# Patient Record
Sex: FEMALE | Race: BLACK OR AFRICAN AMERICAN | Employment: OTHER | URBAN - METROPOLITAN AREA
[De-identification: names, ages, dates, MRNs, and addresses within clinical notes are randomized per-mention and may not be internally consistent; named-entity substitution may affect disease eponyms.]

---

## 2023-03-16 ENCOUNTER — APPOINTMENT (OUTPATIENT)
Dept: GENERAL RADIOLOGY | Age: 53
End: 2023-03-16
Payer: MEDICAID

## 2023-03-16 ENCOUNTER — HOSPITAL ENCOUNTER (OUTPATIENT)
Age: 53
Setting detail: OBSERVATION
Discharge: HOME | End: 2023-03-17
Attending: EMERGENCY MEDICINE | Admitting: EMERGENCY MEDICINE
Payer: MEDICAID
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
## 2023-03-16 DIAGNOSIS — S82.832A CLOSED FRACTURE OF DISTAL END OF LEFT FIBULA, UNSPECIFIED FRACTURE MORPHOLOGY, INITIAL ENCOUNTER: Primary | ICD-10-CM

## 2023-03-16 PROBLEM — S82.301A FRACTURE OF DISTAL END OF TIBIA WITH FIBULA, RIGHT, CLOSED, INITIAL ENCOUNTER: Status: ACTIVE | Noted: 2023-03-16

## 2023-03-16 PROBLEM — S82.831A FRACTURE OF DISTAL END OF TIBIA WITH FIBULA, RIGHT, CLOSED, INITIAL ENCOUNTER: Status: ACTIVE | Noted: 2023-03-16

## 2023-03-16 LAB
SARS-COV-2 RDRP RESP QL NAA+PROBE: NOT DETECTED
SPECIMEN DESCRIPTION: NORMAL

## 2023-03-16 PROCEDURE — 72170 X-RAY EXAM OF PELVIS: CPT

## 2023-03-16 PROCEDURE — 87635 SARS-COV-2 COVID-19 AMP PRB: CPT

## 2023-03-16 PROCEDURE — 73600 X-RAY EXAM OF ANKLE: CPT

## 2023-03-16 PROCEDURE — 99285 EMERGENCY DEPT VISIT HI MDM: CPT

## 2023-03-16 PROCEDURE — 73590 X-RAY EXAM OF LOWER LEG: CPT

## 2023-03-16 PROCEDURE — 73610 X-RAY EXAM OF ANKLE: CPT

## 2023-03-16 PROCEDURE — G0378 HOSPITAL OBSERVATION PER HR: HCPCS

## 2023-03-16 PROCEDURE — 73630 X-RAY EXAM OF FOOT: CPT

## 2023-03-16 PROCEDURE — 6370000000 HC RX 637 (ALT 250 FOR IP): Performed by: STUDENT IN AN ORGANIZED HEALTH CARE EDUCATION/TRAINING PROGRAM

## 2023-03-16 RX ORDER — ERGOCALCIFEROL 1.25 MG/1
50000 CAPSULE ORAL WEEKLY
Qty: 8 CAPSULE | Refills: 0 | Status: SHIPPED | OUTPATIENT
Start: 2023-03-16 | End: 2023-05-05

## 2023-03-16 RX ORDER — PANTOPRAZOLE SODIUM 40 MG/1
40 TABLET, DELAYED RELEASE ORAL DAILY
COMMUNITY

## 2023-03-16 RX ORDER — SODIUM CHLORIDE 0.9 % (FLUSH) 0.9 %
5-40 SYRINGE (ML) INJECTION EVERY 12 HOURS SCHEDULED
Status: DISCONTINUED | OUTPATIENT
Start: 2023-03-16 | End: 2023-03-17 | Stop reason: HOSPADM

## 2023-03-16 RX ORDER — SODIUM CHLORIDE 0.9 % (FLUSH) 0.9 %
5-40 SYRINGE (ML) INJECTION PRN
Status: DISCONTINUED | OUTPATIENT
Start: 2023-03-16 | End: 2023-03-17 | Stop reason: HOSPADM

## 2023-03-16 RX ORDER — ACETAMINOPHEN 325 MG/1
650 TABLET ORAL EVERY 4 HOURS PRN
Status: DISCONTINUED | OUTPATIENT
Start: 2023-03-16 | End: 2023-03-17

## 2023-03-16 RX ORDER — LATANOPROST 50 UG/ML
1 SOLUTION/ DROPS OPHTHALMIC NIGHTLY
COMMUNITY

## 2023-03-16 RX ORDER — ENOXAPARIN SODIUM 100 MG/ML
40 INJECTION SUBCUTANEOUS DAILY
Status: DISCONTINUED | OUTPATIENT
Start: 2023-03-17 | End: 2023-03-17 | Stop reason: HOSPADM

## 2023-03-16 RX ORDER — OXYCODONE HYDROCHLORIDE AND ACETAMINOPHEN 5; 325 MG/1; MG/1
1 TABLET ORAL ONCE
Status: COMPLETED | OUTPATIENT
Start: 2023-03-16 | End: 2023-03-16

## 2023-03-16 RX ORDER — HYDROCHLOROTHIAZIDE 25 MG/1
25 TABLET ORAL DAILY
Status: DISCONTINUED | OUTPATIENT
Start: 2023-03-17 | End: 2023-03-17 | Stop reason: HOSPADM

## 2023-03-16 RX ORDER — HYDROCHLOROTHIAZIDE 25 MG/1
25 TABLET ORAL DAILY
COMMUNITY

## 2023-03-16 RX ORDER — MULTIVIT-MIN/IRON/FOLIC ACID/K 18-600-40
CAPSULE ORAL
COMMUNITY

## 2023-03-16 RX ORDER — OXYCODONE HYDROCHLORIDE 5 MG/1
5 TABLET ORAL EVERY 6 HOURS PRN
Status: DISCONTINUED | OUTPATIENT
Start: 2023-03-16 | End: 2023-03-17

## 2023-03-16 RX ORDER — LORATADINE 10 MG/1
10 CAPSULE, LIQUID FILLED ORAL DAILY
COMMUNITY

## 2023-03-16 RX ORDER — MONTELUKAST SODIUM 10 MG/1
10 TABLET ORAL NIGHTLY
COMMUNITY

## 2023-03-16 RX ORDER — ONDANSETRON 2 MG/ML
4 INJECTION INTRAMUSCULAR; INTRAVENOUS EVERY 6 HOURS PRN
Status: DISCONTINUED | OUTPATIENT
Start: 2023-03-16 | End: 2023-03-17 | Stop reason: HOSPADM

## 2023-03-16 RX ORDER — SODIUM CHLORIDE 9 MG/ML
INJECTION, SOLUTION INTRAVENOUS PRN
Status: DISCONTINUED | OUTPATIENT
Start: 2023-03-16 | End: 2023-03-17 | Stop reason: HOSPADM

## 2023-03-16 RX ORDER — ONDANSETRON 4 MG/1
4 TABLET, ORALLY DISINTEGRATING ORAL EVERY 8 HOURS PRN
Status: DISCONTINUED | OUTPATIENT
Start: 2023-03-16 | End: 2023-03-17 | Stop reason: HOSPADM

## 2023-03-16 RX ORDER — ACETAMINOPHEN 500 MG
1000 TABLET ORAL EVERY 6 HOURS
Status: DISCONTINUED | OUTPATIENT
Start: 2023-03-16 | End: 2023-03-17 | Stop reason: HOSPADM

## 2023-03-16 RX ADMIN — OXYCODONE HYDROCHLORIDE AND ACETAMINOPHEN 1 TABLET: 5; 325 TABLET ORAL at 21:43

## 2023-03-16 RX ADMIN — OXYCODONE HYDROCHLORIDE AND ACETAMINOPHEN 1 TABLET: 5; 325 TABLET ORAL at 15:41

## 2023-03-16 ASSESSMENT — PAIN DESCRIPTION - DESCRIPTORS: DESCRIPTORS: ACHING;BURNING

## 2023-03-16 ASSESSMENT — LIFESTYLE VARIABLES
HOW OFTEN DO YOU HAVE A DRINK CONTAINING ALCOHOL: NEVER
HOW MANY STANDARD DRINKS CONTAINING ALCOHOL DO YOU HAVE ON A TYPICAL DAY: PATIENT DOES NOT DRINK

## 2023-03-16 ASSESSMENT — PAIN DESCRIPTION - ORIENTATION
ORIENTATION: LEFT
ORIENTATION: RIGHT
ORIENTATION: LEFT

## 2023-03-16 ASSESSMENT — PAIN SCALES - GENERAL
PAINLEVEL_OUTOF10: 7
PAINLEVEL_OUTOF10: 10

## 2023-03-16 ASSESSMENT — ENCOUNTER SYMPTOMS
BACK PAIN: 0
ABDOMINAL PAIN: 0
SHORTNESS OF BREATH: 0
NAUSEA: 0
VOMITING: 0

## 2023-03-16 ASSESSMENT — PAIN DESCRIPTION - LOCATION
LOCATION: ANKLE

## 2023-03-16 ASSESSMENT — PAIN - FUNCTIONAL ASSESSMENT
PAIN_FUNCTIONAL_ASSESSMENT: 0-10
PAIN_FUNCTIONAL_ASSESSMENT: 0-10

## 2023-03-16 NOTE — ED PROVIDER NOTES
Handoff taken on the following patient from prior Attending Physician:    Pt Name: Jose Raul Smith    PCP:  No primary care provider on file. Attestation    I was available and discussed any additional care issues that arose and coordinated the management plans with the resident(s) caring for the patient during my duty period. Any areas of disagreement with residents documentation of care or procedures are noted on the chart. I was personally present for the key portions of any/all procedures during my duty period. I have documented in the chart those procedures where I was not present during the key portions.     Patient 58-year-old female injury to both ankles currently awaiting x-ray results was having difficulty ambulating     Humberto Fowler,   03/16/23 3672

## 2023-03-16 NOTE — CARE COORDINATION
Case Management Assessment  Initial Evaluation    Date/Time of Evaluation: 3/16/2023 5:30 PM  Assessment Completed by: Maria G Shah RN    If patient is discharged prior to next notation, then this note serves as note for discharge by case management. Patient Name: Michoacano Henry                   YOB: 1970  Diagnosis: No admission diagnoses are documented for this encounter. Date / Time: 3/16/2023  3:15 PM    Patient Admission Status: Emergency   Readmission Risk (Low < 19, Mod (19-27), High > 27): No data recorded  Current PCP: No primary care provider on file. PCP verified by CM? Yes (Melanie Buckner)    Chart Reviewed: Yes      History Provided by: Patient  Patient Orientation: Alert and Oriented    Patient Cognition: Alert    Hospitalization in the last 30 days (Readmission):  No    If yes, Readmission Assessment in CM Navigator will be completed. Advance Directives:      Code Status: Not on file   Patient's Primary Decision Maker is:        Discharge Planning:    Patient lives with: Spouse/Significant Other Type of Home: Apartment  Primary Care Giver: Self  Patient Support Systems include: Spouse/Significant Other, Children   Current Financial resources:    Current community resources:    Current services prior to admission: None            Current DME:              Type of Home Care services:       ADLS  Prior functional level: Independent in ADLs/IADLs  Current functional level: Assistance with the following:, Shopping, Housework, Cooking, Mobility    PT AM-PAC:   /24  OT AM-PAC:   /24    Family can provide assistance at DC: Yes  Would you like Case Management to discuss the discharge plan with any other family members/significant others, and if so, who?  Yes (spouse)  Plans to Return to Present Housing: Yes  Other Identified Issues/Barriers to RETURNING to current housing: mobility   Potential Assistance needed at discharge: [place], Durable Medical Equipment            Potential DME: Kilo Ramirez, [address] [facility]  Patient expects to discharge to: (P) House  Plan for transportation at discharge:      Financial    Payor: Richard Fierro / Plan: Richard Fierro / Product Type: *No Product type* /     Does insurance require precert for SNF: Yes    Potential assistance Purchasing Medications: (P) No  Meds-to-Beds request:      No Pharmacies Listed    Notes:    Factors facilitating achievement of predicted outcomes: Family support, Cooperative, and Pleasant    Barriers to discharge: Pain and Medical complications    Additional Case Management Notes: Patient refusing SNF placement. Patient staying for pain control and PT/OT evaluation. [facility] and what DME is needed for the patient to safely return home. The Plan for Transition of Care is related to the following treatment goals of No admission diagnoses are documented for this encounter. IF APPLICABLE: The Patient and/or patient representative Prasanth Carroll and her family were provided with a choice of provider and agrees with the discharge plan. Freedom of choice list with basic dialogue that supports the patient's individualized plan of care/goals and shares the quality data associated with the providers was provided to:     Patient Representative Name:       The Patient and/or Patient Representative Agree with the Discharge Plan? Post Acute Facility/Agency List     Provided patient with the following list, the list includes the overall star ratings obtained from CMS per the Medicare Web site (www.Medicare.gov):     [] [address]  [] Acute Inpatient Rehabilitation Facilities  [] Skilled Nursing Facilities  [x] Home Care    Provided verbal instructions on how to utilize the QR Code to obtain additional detailed star ratings from www. Medicare. gov     offered to print and provide the detailed list:    []Accepted   [x]Declined    The following printed detailed lists were provided:      Erika Luke RN  Case Management Department  Ph: [phone]

## 2023-03-16 NOTE — ED TRIAGE NOTES
Pt arrives to the ER after rolling her ankle and falling down the steps. Pt has noticeable swelling to the left ankle.  Pt denies any LOC or any other injuries to the ankle

## 2023-03-16 NOTE — ED PROVIDER NOTES
[address] [facility]     Emergency Department     Faculty Attestation    I performed a history and physical examination of the patient and discussed management with the resident. I reviewed the residents note and agree with the documented findings and plan of care. Any areas of disagreement are noted on the chart. I was personally present for the key portions of any procedures. I have documented in the chart those procedures where I was not present during the key portions. I have reviewed the emergency nurses triage note. I agree with the chief complaint, past medical history, past surgical history, allergies, medications, social and family history as documented unless otherwise noted below. For Physician Assistant/ Nurse Practitioner cases/documentation I have personally evaluated this patient and have completed at least one if not all key elements of the E/M (history, physical exam, and MDM). Additional findings are as noted. Primary Care Physician:  No primary care provider on file.     CHIEF COMPLAINT       Chief Complaint   Patient presents with    Ankle Injury       RECENT VITALS:   Temp: 97.5 °F (36.4 °C),  Heart Rate: 77, Resp: 16, BP: (!) 155/84    LABS:  Labs Reviewed - No data to display      PERTINENT ATTENDING PHYSICIAN COMMENTS:    Patient presents with bilateral ankle pain and foot pain she was walking down a flight of stairs with no handrail and she stepped off the edge landing and twisting both ankles she has been able to ambulate since there is no head injury no knee or hip injury she is brought in by squad    critical Vinny Read MD, MD, [facility]  Attending Emergency  Physician              Yonathan Bob MD  03/16/23 9667

## 2023-03-16 NOTE — H&P
TRAUMA H&P/CONSULT    PATIENT NAME: Jayce Garcia  YOB: 1970  MEDICAL RECORD NO. [id number]   DATE: 3/16/2023  PRIMARY CARE PHYSICIAN: No primary care provider on file. PATIENT EVALUATED AT THE REQUEST OF : Phu Quiles    ACTIVATION   []Trauma Alert     [] Trauma Priority     [x]Trauma Consult. There is no problem list on file for this patient. IMPRESSION AND PLAN:     Fall down one stair, -LOC, -AC, left distal fibular fx, sprained right ankle  -Ortho consulted. No surgical intervention at this time. Placed in CAM boot and provided with crutches. WBAT BLE. Follow-up next week outpatient. -F/u XR pelvis    -If XR negative, dispo per ED    If intracranial hemorrhage is present, is it a:  [] BIG 1  [] BIG 2  [] BIG 3  If chest wall injury: Rib score___    CONSULT SERVICES    [] Neurosurgery     [x] Orthopedic Surgery    [] Cardiothoracic     [] Facial Trauma    [] Plastic Surgery (Burn)    [] Pediatric Surgery     [] Internal Medicine    [] Pulmonary Medicine    [] Geriatrics    [] Other:        HISTORY:     Chief Complaint:  \"I missed a step\"    GENERAL DATA  Patient information was obtained from patient. History/Exam limitations: none. Injury Date: 3/16/23   Approximate Injury Time: afternoon        Transport mode:   []Ambulance      [] Helicopter     [x]Car       [] Other  Referring Hospital: none    SETTING OF TRAUMATIC EVENT   Location (e.g., home, farm, industry, street): home  Specific Details of Location (e.g., bedroom, kitchen, garage, highway): stairs    MECHANISM OF INJURY         [x] Fall    []From Standing     []From Height __ Ft     [x]Down _1__steps  []Other___    HISTORY:     Jayce Garcia is a female that presented to the Emergency Department following twisting her left ankle and falling down one step at home. Patient landed on her left hip. She did not hit her head. No LOC. No AC. She is only complaining of bilateral ankle pain.  She denies any headache, neck pain, back pain, chest pain, or abdominal pain. Traumatic loss of Consciousness [x]No   []Yes Duration(min)       [] Unknown     Total Fluids Given Prior To Arrival  mL    MEDICATIONS:     Prior to Admission medications    Medication Sig Start Date End Date Taking? Authorizing Provider   hydroCHLOROthiazide (HYDRODIURIL) 25 MG tablet Take 25 mg by mouth daily   Yes Historical Provider, MD   pantoprazole (PROTONIX) 40 MG tablet Take 40 mg by mouth daily   Yes Historical Provider, MD   loratadine (CLARITIN) 10 MG capsule Take 10 mg by mouth daily   Yes Historical Provider, MD   montelukast (SINGULAIR) 10 MG tablet Take 10 mg by mouth nightly   Yes Historical Provider, MD   latanoprost (XALATAN) 0.005 % ophthalmic solution 1 drop nightly   Yes Historical Provider, MD   Cholecalciferol (VITAMIN D) 50 MCG (2000 UT) CAPS capsule Take by mouth   Yes Historical Provider, MD       ALLERGIES:     Ibuprofen and Morphine    PAST MEDICAL/SURGICAL HISTORY: Hypertension   has no past medical history on file. has no past surgical history on file. FAMILY HISTORY     family history is not on file. SOCIAL HISTORY     reports that she has never smoked. She uses smokeless tobacco.   reports that she does not currently use alcohol. reports that she does not currently use drugs. Review of Systems:    Review of Systems   Respiratory:  Negative for shortness of breath. Cardiovascular:  Negative for chest pain. Gastrointestinal:  Negative for abdominal pain, nausea and vomiting. Musculoskeletal:  Negative for back pain and neck pain. Positive for bilateral ankle pain. Neurological:  Negative for dizziness, weakness, light-headedness, numbness and headaches. Hematological:  Does not bruise/bleed easily. PHYSICAL EXAMINATION:     VITAL SIGNS:   Vitals:    03/16/23 1800   BP: (!) 151/102   Pulse:    Resp:    Temp:    SpO2:        Physical Exam  Constitutional:       General: She is not in acute distress.   HENT: Head: Normocephalic and atraumatic. Right Ear: External ear normal.      Left Ear: External ear normal.      Nose: Nose normal.      Mouth/Throat:      Mouth: Mucous membranes are moist.   Eyes:      Extraocular Movements: Extraocular movements intact. Conjunctiva/sclera: Conjunctivae normal.      Pupils: Pupils are equal, round, and reactive to light. Cardiovascular:      Rate and Rhythm: Normal rate. Pulses: Normal pulses. Pulmonary:      Effort: Pulmonary effort is normal. No respiratory distress. Chest:      Chest wall: No tenderness. Abdominal:      General: There is no distension. Palpations: Abdomen is soft. Tenderness: There is no abdominal tenderness. There is no guarding or rebound. Musculoskeletal:      Cervical back: Normal range of motion. No tenderness. Comments: Left ankle in CAM boot. Tenderness to lateral aspect of right ankle with mild swelling. Tenderness to lateral aspect of proximal fibula. Pelvis stable. No midline cervical, thoracic, or lumbar spine tenderness. Skin:     Findings: No bruising or lesion. Neurological:      General: No focal deficit present. Mental Status: She is alert and oriented to person, place, and time. Sensory: No sensory deficit. Motor: No weakness. FOCUSED ABDOMINAL SONOGRAM FOR TRAUMA (FAST): A good  quality examination was performed by Dr. Darian Mathis and representative images were obtained. [x] No free fluid in the abdomen   [] Free fluid in RUQ   [] Free fluid in LUQ  [] Free fluid in Pelvis  [] Pericardial fluid  [] Other:        RADIOLOGY  XR TIBIA FIBULA LEFT (2 VIEWS)   Final Result   1. Stable, acute nondisplaced oblique fracture of the distal fibula. 2. No evidence of instability on the stress views. XR ANKLE LEFT (2 VIEWS)   Final Result   1. Stable, acute nondisplaced oblique fracture of the distal fibula. 2. No evidence of instability on the stress views.          XR ANKLE LEFT (MIN 3 VIEWS)   Final Result   1. Left ankle and foot: Acute nondisplaced fracture of the distal fibula   (Ga C) with mild widening of the medial clear space. 2.  Right ankle and foot: No acute osseous abnormality. XR FOOT LEFT (MIN 3 VIEWS)   Final Result   1. Left ankle and foot: Acute nondisplaced fracture of the distal fibula   (Ga C) with mild widening of the medial clear space. 2.  Right ankle and foot: No acute osseous abnormality. XR FOOT RIGHT (MIN 3 VIEWS)   Final Result   1. Left ankle and foot: Acute nondisplaced fracture of the distal fibula   (Ga C) with mild widening of the medial clear space. 2.  Right ankle and foot: No acute osseous abnormality. XR ANKLE RIGHT (MIN 3 VIEWS)   Final Result   1. Left ankle and foot: Acute nondisplaced fracture of the distal fibula   (Ga C) with mild widening of the medial clear space. 2.  Right ankle and foot: No acute osseous abnormality.          XR PELVIS (1-2 VIEWS)    (Results Pending)         LABS  Labs Reviewed - No data to display      Nasim Ferreira MD  3/16/23, 7:35 PM

## 2023-03-16 NOTE — PROGRESS NOTES
This patient was assigned to me by error. This patient was never interviewed nor examined by me. I did not participate in the care of this patient.

## 2023-03-16 NOTE — ED PROVIDER NOTES
[facility] ED  Emergency Department Encounter  Emergency Medicine Resident     Pt Name:Mavis Bocanegra  MRN: [id number]  [facility] 1970  Date of evaluation: 3/16/23  PCP:  No primary care provider on file. Note Started: 4:13 PM EDT    CHIEF COMPLAINT       Chief Complaint   Patient presents with    Ankle Injury       HISTORY OF PRESENT ILLNESS  (Location/Symptom, Timing/Onset, Context/Setting, Quality, Duration, Modifying Factors, Severity.)      Livingston Opitz is a 47 y.o. female who presents with bilateral ankle pain after fall earlier today. Patient was walking downstairs but did not have a side rail and stepped off the side of the stairs. Denies hitting her head or loss of consciousness. No AC/AP. Complaining of bilateral ankle pain but denies pain elsewhere. No headache, changes in vision (chronic blurry vision unchanged from previous), numbness, weakness, neck pain, back pain, chest pain, abdominal pain. PAST MEDICAL / SURGICAL / SOCIAL / FAMILY HISTORY      has no past medical history on file. has no past surgical history on file.     Social History     Socioeconomic History    Marital status:      Spouse name: Not on file    Number of children: Not on file    Years of education: Not on file    Highest education level: Not on file   Occupational History    Not on file   Tobacco Use    Smoking status: Never    Smokeless tobacco: Current   Vaping Use    Vaping Use: Every day    Substances: Nicotine   Substance and Sexual Activity    Alcohol use: Not Currently    Drug use: Not Currently    Sexual activity: Yes     Partners: Male   Other Topics Concern    Not on file   Social History Narrative    Not on file     Social Determinants of Health     Financial Resource Strain: Not on file   Food Insecurity: Not on file   Transportation Needs: Not on file   Physical Activity: Not on file   Stress: Not on file   Social Connections: Not on file   Intimate Partner Violence: Not on file   Housing Stability: Not on file       History reviewed. No pertinent family history. Allergies:  Ibuprofen and Morphine    Home Medications:  Prior to Admission medications    Medication Sig Start Date End Date Taking? Authorizing Provider   hydroCHLOROthiazide (HYDRODIURIL) 25 MG tablet Take 25 mg by mouth daily   Yes Historical Provider, MD   pantoprazole (PROTONIX) 40 MG tablet Take 40 mg by mouth daily   Yes Historical Provider, MD   loratadine (CLARITIN) 10 MG capsule Take 10 mg by mouth daily   Yes Historical Provider, MD   montelukast (SINGULAIR) 10 MG tablet Take 10 mg by mouth nightly   Yes Historical Provider, MD   latanoprost (XALATAN) 0.005 % ophthalmic solution 1 drop nightly   Yes Historical Provider, MD   Cholecalciferol (VITAMIN D) 50 MCG (2000 UT) CAPS capsule Take by mouth   Yes Historical Provider, MD   vitamin D (ERGOCALCIFEROL) 1.25 MG (21614 UT) CAPS capsule Take 1 capsule by mouth once a week for 8 doses 3/16/23 5/5/23 Yes Lorita Pickup, DO     REVIEW OF SYSTEMS       Review of Systems   Musculoskeletal:         Positive for bilateral ankle pain   See HPI for pertinent negatives     PHYSICAL EXAM      INITIAL VITALS:   BP (!) 151/102   Pulse 77   Temp 97.5 °F (36.4 °C) (Oral)   Resp 16   SpO2 100%     Physical Exam  Constitutional:       General: She is not in acute distress. Appearance: Normal appearance. HENT:      Head: Normocephalic and atraumatic. Right Ear: Tympanic membrane, ear canal and external ear normal.      Left Ear: Tympanic membrane, ear canal and external ear normal.      Mouth/Throat:      Mouth: Mucous membranes are moist.   Eyes:      General:         Right eye: No discharge. Left eye: No discharge. Extraocular Movements: Extraocular movements intact. Pupils: Pupils are equal, round, and reactive to light. Cardiovascular:      Rate and Rhythm: Normal rate and regular rhythm. Pulses: Normal pulses.       Heart sounds: No murmur heard. Pulmonary:      Effort: Pulmonary effort is normal. No respiratory distress. Breath sounds: Normal breath sounds. No wheezing, rhonchi or rales. Abdominal:      Palpations: Abdomen is soft. Tenderness: There is no abdominal tenderness. There is no guarding or rebound. Musculoskeletal:         General: No deformity or signs of injury. Cervical back: No muscular tenderness. Comments: Tenderness and swelling of bilateral lateral malleoli, left greater than right. No midline spinal tenderness. No clavicular tenderness, chest wall tenderness, abdominal tenderness. Pelvis stable. No tenderness of BUE. 5/5 strength BUE/BLE. Sensation intact in all extremities. Radial and pedal pulses 2+. Skin:     General: Skin is warm and dry. Capillary Refill: Capillary refill takes less than 2 seconds. Neurological:      General: No focal deficit present. Mental Status: She is alert and oriented to person, place, and time. Cranial Nerves: No cranial nerve deficit. Sensory: No sensory deficit. Motor: No weakness. Coordination: Coordination normal.      Gait: Gait normal.      Deep Tendon Reflexes: Reflexes normal.      Comments: A&O x3.  Cranial nerves II-XII intact. 5/5 strength in BUE/BLE. Sensation intact. Finger-nose-finger intact. No pronator drift. DDX/DIAGNOSTIC RESULTS / EMERGENCY DEPARTMENT COURSE / MDM     Medical Decision Making  79-year-old female who presents for evaluation of bilateral ankle pain following fall. Patient slightly hypertensive but vitals otherwise unremarkable. On exam patient appears uncomfortable but nontoxic. Heart RRR, lungs clear. No midline spinal tenderness. No focal neurologic deficits. Low risk for head injury given [place] head CT criteria. Low risk for C-spine injury via [place] C-spine criteria. Tenderness and swelling of bilateral lateral malleoli, left greater than right. Neurovascularly intact. Will obtain bilateral foot/ankle x-rays. Percocet for pain. Amount and/or Complexity of Data Reviewed  Independent Historian: spouse  Radiology: ordered. Risk  Prescription drug management. Decision regarding hospitalization. EKG  None    All EKG's are interpreted by the Emergency Department Physician who either signs or Co-signs this chart in the absence of a cardiologist.    EMERGENCY DEPARTMENT COURSE:    ED Course as of 03/16/23 2227   Thu Mar 16, 2023   1639 IMPRESSION:  1. Left ankle and foot: Acute nondisplaced fracture of the distal fibula  (Ga C) with mild widening of the medial clear space. 2.  Right ankle and foot: No acute osseous abnormality [AF]   1643 Ortho consulted [AF]   1081 Spoke to orthopedics who placed a splint and recommends outpatient follow-up. Patient is unable to ambulate due to right ankle pain. Seen by case management who is unable to place today. Will consult trauma [AF]   1938 Hip x-ray obtained per trauma. If negative okay for admission to observation.  [AF]   2043 IMPRESSION:  Contour abnormality of the left superior pubic ramus, for which a underlying  fracture is suspected. Correlation with CT is suggested. [AF]   9248 Patient reevaluated by orthopedics. No additional imaging necessary. Trauma team signed off. Will admit to observation for PT/OT as patient is unable to ambulate due to bilateral ankle pain. Patient is weightbearing as tolerated per Ortho. [AF]      ED Course User Index  [AF] Vasile Lee DO       PROCEDURES:  None    CONSULTS:  IP CONSULT TO ORTHOPEDIC SURGERY  IP CONSULT TO TRAUMA SURGERY    CRITICAL CARE:  There was significant risk of life threatening deterioration of patient's condition requiring my direct management. Critical care time 0 minutes, excluding any documented procedures. FINAL IMPRESSION      1. Closed fracture of distal end of left fibula, unspecified fracture morphology, initial encounter    2.  Fall from bed, initial encounter          DISPOSITION / Andrestyvan Stringer. 291 Admitted 03/16/2023 10:23:34 PM      PATIENT REFERRED TO:  No follow-up provider specified.     DISCHARGE MEDICATIONS:  Current Discharge Medication List        START taking these medications    Details   vitamin D (ERGOCALCIFEROL) 1.25 MG (32672 UT) CAPS capsule Take 1 capsule by mouth once a week for 8 doses  Qty: 8 capsule, Refills: 0             Yaa Burks DO  Emergency Medicine Resident    (Please note that portions of thisnote were completed with a voice recognition program.  Efforts were made to edit the dictations but occasionally words are mis-transcribed.)       Yaa Burks DO  Resident  03/16/23 8355

## 2023-03-16 NOTE — DISCHARGE INSTRUCTIONS
You were admitted to the hospital for right sprained ankle and left distal fibular fracture. You were evaluated by Orthopedics and suggested to wear bilateral CAM boots. You worked with physical therapy who recommend crutches and a walker along with a shower chair. If you notice any concerning symptoms please return to the ER immediately. These can include but are not limited to: fevers, chills, shortness of breath, vomiting, weakness of the extremities, changes in your mental status, numbness, pale extremities, or chest pain. Medications: Continue taking your home medications as previously directed. For pain You may take tylenol 1,000mg by mouth every 6 hours as needed for pain. You may also take flexeril, a muscle relaxer. Do not drive or operate heavy machinery while taking this medication as it may make you sleepy. You may alternate application of ice and heat 20 minutes at a time as desired. Follow up: Please follow up with Dr. Josh Allen as scheduled     Orthopaedic Instructions:  -Weight bearing status: Weight bearing as tolerated with the left and right legs  -Maintain the protective boots at all times, use crutches to assist ambulation. Only remove boots while showering or sleeping.  -Always look for signs of compartment syndrome: pain out of proportion to the injury, pain not controlled with pain medication, numbness in digits, changing of color of digits (paleness). If these signs occur return to ED immediately for reassessment.  -Ice (20 minutes on and off 1 hour) and elevate above the level of the heart to reduce swelling and throbbing pain.  -Call the office or come to Emergency Room if signs of infection appear (hot, swollen, red, draining pus, fever). -Take medications as prescribed. -Follow up with Dr. Josh Allen in his office 3/21/23 at 8:15am. Call [phone] to schedule/confirm or with any questions/concerns.

## 2023-03-16 NOTE — CONSULTS
ORTHOPAEDIC SURGERY   CONSULT NOTE  (Dr. Alfa Reyes)    CC/Reason for Consult:    Left distal fibular fracture    HPI     47 y.o. female presented to the Emergency Department for evaluation of their left ankle pain. She was walking down the stairs, and as she reached the last step, she inadvertently rolled her right ankle inward and it caused her to come down on her left ankle with inversion dorsiflexion type mechanism. She denies hitting her head or any loss of consciousness. Denies lightheadedness or dizziness preceding the fall. She had immediate pain in her left ankle. She was incapable of standing or ambulating following the fall. Her  and the mailman helped to bring herself inside to the couch. EMS was called and she was taken here for evaluation. Denies any prior history of ankle pain prior to this ankle. Denies any numbness, burning, tingling sensations. Has no other orthopaedic complaints at this time. She denies any pelvic pain, denies any remote history of pelvic fractures or injuries. At baseline, the patient ambulates without assisted devices, including a cane, walker, or wheelchair. Patient denies taking chemical anticoagulation. The patient works as a home health aid. Has prior history of bleeding ulcer and this precludes her intake of NSAIDs. Endorses vaping nicotine products. Denies history of DM. Past Orthopaedic Hx:  Remote history of breaking a lesser toe on her right side (cannot recall specific location) but denies any surgical intervention. Past Medical Hx:    has no past medical history on file. has no past surgical history on file. Past Surgical Hx:  History reviewed. No pertinent surgical history. Medications:  Prior to Admission medications    Medication Sig Start Date End Date Taking?  Authorizing Provider   hydroCHLOROthiazide (HYDRODIURIL) 25 MG tablet Take 25 mg by mouth daily   Yes Historical Provider, MD   pantoprazole (PROTONIX) 40 MG tablet Take 40 mg by mouth daily   Yes Historical Provider, MD   loratadine (CLARITIN) 10 MG capsule Take 10 mg by mouth daily   Yes Historical Provider, MD   montelukast (SINGULAIR) 10 MG tablet Take 10 mg by mouth nightly   Yes Historical Provider, MD   latanoprost (XALATAN) 0.005 % ophthalmic solution 1 drop nightly   Yes Historical Provider, MD   Cholecalciferol (VITAMIN D) 50 MCG (2000 UT) CAPS capsule Take by mouth   Yes Historical Provider, MD     Allergies:     Ibuprofen and Morphine    Social Hx:    reports that she has never smoked. She uses smokeless tobacco.   reports that she does not currently use alcohol. reports that she does not currently use drugs. Family Hx:     family history is not on file. Pertinent Systemic Review:    Constitutional: Negative for fever and chills. Respiratory: Negative for cough. Cardiovascular: Negative for chest pain. Musculoskeletal: Positive for pain in their left and right ankles. Skin: Negative for itching and rash. Neurological: Negative for numbness, tingling, weakness. OBJECTIVE     PE:  Blood pressure (!) 155/84, pulse 77, temperature 97.5 °F (36.4 °C), temperature source Oral, resp. rate 16, SpO2 100 %. General: Alert and Oriented, NAD, Cooperative. Head: Normocephalic, Atraumatic. Cardiovascular: Regular Rate. Respiratory: Chest Symmetric, No Accessory Muscle Use. Musculoskeletal:  Pelvis: Stable to anterior and lateral compression. LUE:   Skin intact. No skin tenting. No significant swelling. No ecchymosis, abrasion, deformity, or lacerations. Non-tender to palpation. No bony crepitus. Comparments soft and easily compressible. Full AROM at shoulder/elbow/wrist/fingers, without pain. Elbow is stable to varus and valgus stress. Ulnar/Median/AIN/PIN/Radial motor intact. Axillary/MCN/Median/Ulnar/Radial nerve distributions SILT. Radial pulse 2+ with BCR. RUE:   Skin intact. No skin tenting. No significant swelling.  No ecchymosis, abrasion, deformity, or lacerations. Non-tender to palpation. No bony crepitus. Comparments soft and easily compressible. Full AROM at shoulder/elbow/wrist/fingers, without pain. Elbow is stable to varus and valgus stress. Ulnar/Median/AIN/PIN/Radial motor intact. Axillary/MCN/Median/Ulnar/Radial nerve distributions SILT. Radial pulse 2+ with BCR. LLE:   Skin intact. No skin tenting. No significant swelling. No ecchymoses, abrasion, deformity, or lacerations. Tender to palpation about lateral malleolus, directly over distal most fibula and proximally up to ~6cm. No tenderness about fibular head or neck. No Bony Crepitus. Compartments soft and easily compressible. EHL/FHL motor intact. Unable to assess motor status of TA/GS complex due to pain. Sural/Saphenous/SPN/DPN/Plantar nerve distribution SILT. Patient has no groin pain with log roll maneuver. Lachman 1A, knee appears stable to varus and valgus stress testing at 0 and 30 degrees. Able to perform active straight leg raise. DP and PT pulses 2+ with BCR. RLE:   Skin intact. No skin tenting. Mild swelling about lateral ankle. No ecchymoses, abrasion, deformity, or lacerations. Tender to palpation about anterior talofibular ligament. Compartments soft and easily compressible. EHL/FHL/TA/GS complex motor intact. Sural/Saphenous/SPN/DPN/Plantar nerve distribution SILT. Patient has no groin pain with log roll maneuver. Lachman 1A, knee appears stable to varus and valgus stress testing at 0 and 30 degrees. Able to perform active straight leg raise. DP and PT pulses 2+ with BCR. Labs:  No results for input(s): WBC, HGB, HCT, PLT, INR, PTT, NA, K, BUN, CREATININE, GLUCOSE, SEDRATE, CRP in the last 72 hours. Invalid input(s): PT     Imaging:   AP, mortise and lateral radiographic views of the left ankle demonstrating a minimally displaced Ga C distal fibular fracture. Stress radiographs do not demonstrate any further widening of the medial clear space.     AP of the pelvis was reviewed and demonstrates a linear lucency of the left superior pubic rami which may represent a remote fracture. ASSESSMENT     47 y.o. female who fell from standing height, being seen for:    -Left distal fibular fracture (Ga C)  -Right anterior talofibular ligament sprain  -Left superior pubic rami cortical irregularity. PLAN     -No plan for acute orthopedic surgical intervention at this time  -Placed in CAM boots bilaterally and given crutches to help ambulate  -Weightbearing Status: WBAT to Bilateral Lower Extremities  -Medical Management and Pain Control: Per primary team  -Diet: Per primary team; Shon Zuniga from an Orthopaedic perspective  -ABx/Tetanus: Per primary team  -DVT PPx: Per primary team   -F/u VitD Level  -Ice and elevate extremity for pain and swelling. -PT/OT  -Follow-Up: Dr. Guanaco Berger on 3/21/23 at 8:15am.  -Please contact on call orthopedic resident with any questions. ________________________________  Enio Clemons D.O. Orthopedic Surgery Resident, PGY-1  [address]    PGY-3 Addendum    Patient seen and examined. Agree with subjective and objective portions from Dr. Dallas Lim.     The patient is a 47 y.o. female with the injuries as listed above. No operative intervention per ortho. OK to WBAT to BLE. Encourage CAM boot use to LLE, may wear CAM boot to RLE for comfort. Denies any pelvic pain or remote history of pelvic injuries. Plan to follow up with Dr. Guanaco Berger in clinic on 3/21/2023. OK to DC per ortho.      Electronically signed by Rachael Lisa DO 11:02 PM 3/16/2023

## 2023-03-17 VITALS
HEART RATE: 63 BPM | DIASTOLIC BLOOD PRESSURE: 83 MMHG | BODY MASS INDEX: 32.07 KG/M2 | OXYGEN SATURATION: 100 % | WEIGHT: 181 LBS | SYSTOLIC BLOOD PRESSURE: 116 MMHG | HEIGHT: 63 IN | RESPIRATION RATE: 18 BRPM | TEMPERATURE: 97.2 F

## 2023-03-17 PROCEDURE — G0378 HOSPITAL OBSERVATION PER HR: HCPCS

## 2023-03-17 PROCEDURE — 97530 THERAPEUTIC ACTIVITIES: CPT

## 2023-03-17 PROCEDURE — 97162 PT EVAL MOD COMPLEX 30 MIN: CPT

## 2023-03-17 PROCEDURE — 6360000002 HC RX W HCPCS: Performed by: STUDENT IN AN ORGANIZED HEALTH CARE EDUCATION/TRAINING PROGRAM

## 2023-03-17 PROCEDURE — 96372 THER/PROPH/DIAG INJ SC/IM: CPT

## 2023-03-17 PROCEDURE — 6370000000 HC RX 637 (ALT 250 FOR IP): Performed by: STUDENT IN AN ORGANIZED HEALTH CARE EDUCATION/TRAINING PROGRAM

## 2023-03-17 RX ORDER — ACETAMINOPHEN 500 MG
1000 TABLET ORAL EVERY 6 HOURS PRN
Status: DISCONTINUED | OUTPATIENT
Start: 2023-03-17 | End: 2023-03-17 | Stop reason: HOSPADM

## 2023-03-17 RX ORDER — ACETAMINOPHEN 500 MG
1000 TABLET ORAL EVERY 6 HOURS PRN
Qty: 40 TABLET | Refills: 0 | Status: SHIPPED | OUTPATIENT
Start: 2023-03-17 | End: 2023-03-27

## 2023-03-17 RX ORDER — CYCLOBENZAPRINE HCL 5 MG
5 TABLET ORAL 2 TIMES DAILY PRN
Qty: 10 TABLET | Refills: 0 | Status: SHIPPED | OUTPATIENT
Start: 2023-03-17 | End: 2023-03-22

## 2023-03-17 RX ORDER — PANTOPRAZOLE SODIUM 40 MG/1
40 TABLET, DELAYED RELEASE ORAL
Status: DISCONTINUED | OUTPATIENT
Start: 2023-03-18 | End: 2023-03-17 | Stop reason: HOSPADM

## 2023-03-17 RX ADMIN — ENOXAPARIN SODIUM 40 MG: 40 INJECTION SUBCUTANEOUS at 08:06

## 2023-03-17 RX ADMIN — OXYCODONE HYDROCHLORIDE 5 MG: 5 TABLET ORAL at 08:06

## 2023-03-17 RX ADMIN — ACETAMINOPHEN 1000 MG: 500 TABLET ORAL at 05:13

## 2023-03-17 RX ADMIN — HYDROCHLOROTHIAZIDE 25 MG: 25 TABLET ORAL at 08:06

## 2023-03-17 RX ADMIN — OXYCODONE HYDROCHLORIDE 5 MG: 5 TABLET ORAL at 00:45

## 2023-03-17 RX ADMIN — ACETAMINOPHEN 650 MG: 325 TABLET ORAL at 00:44

## 2023-03-17 ASSESSMENT — PAIN SCALES - GENERAL
PAINLEVEL_OUTOF10: 8
PAINLEVEL_OUTOF10: 6
PAINLEVEL_OUTOF10: 4

## 2023-03-17 NOTE — PROGRESS NOTES
CLINICAL PHARMACY NOTE: MEDS TO BEDS    Total # of Prescriptions Filled: 3   The following medications were delivered to the patient:  VIT D 1.25  FLEXERIL 5MG   TYLENOL 500MG     Additional Documentation:

## 2023-03-17 NOTE — PROGRESS NOTES
Trauma Tertiary Survey    Admit Date: 3/16/2023  Hospital day 0      Subjective:     Patient seen and examined at bedside. Resting comfortably in bed. No pain at this time. Patient to work with PT/OT today. Objective:   PHYSICAL EXAM:   Physical Exam  Constitutional:       General: She is not in acute distress. HENT:      Head: Normocephalic and atraumatic. Right Ear: External ear normal.      Left Ear: External ear normal.      Nose: Nose normal.      Mouth/Throat:      Mouth: Mucous membranes are moist.   Eyes:      Extraocular Movements: Extraocular movements intact. Conjunctiva/sclera: Conjunctivae normal.   Cardiovascular:      Rate and Rhythm: Normal rate. Pulses: Normal pulses. Pulmonary:      Effort: Pulmonary effort is normal. No respiratory distress. Chest:      Chest wall: No tenderness. Abdominal:      General: There is no distension. Palpations: Abdomen is soft. Tenderness: There is no abdominal tenderness. There is no guarding or rebound. Musculoskeletal:         General: Tenderness present. Cervical back: Normal range of motion. No tenderness. Comments: Tenderness to bilateral ankles. Cam boot on left ankle. Skin:     Findings: No bruising or lesion. Neurological:      General: No focal deficit present. Mental Status: She is alert and oriented to person, place, and time.          Spine:     Spine Tenderness ROM   Cervical 0 /10 Normal   Thoracic 0 /10 Normal   Lumbar 0 /10 Normal     Musculoskeletal    Joint Tenderness Swelling ROM   Right shoulder absent absent normal   Left shoulder absent absent normal   Right elbow absent absent normal   Left elbow absent absent normal   Right wrist absent absent normal   Left wrist absent absent normal   Right hand grasp absent absent normal   Left hand grasp absent absent normal   Right hip absent absent normal   Left hip absent absent normal   Right knee absent absent normal   Left knee absent absent normal   Right ankle present absent normal   Left ankle present present abnormal - CAM boot in place   Right foot absent absent normal   Left foot absent absent normal       CONSULTS:   Orthopedic surgery    PROCEDURES: None    [x] Reviewed radiology reports  (All radiology findings correlate to diagnoses/problem list)    [] Incidental findings:    [] Patient/family notified and letter given    Assessment/Plan:     Fall, left distal fibular fracture  -Orthopedic surgery consulted. No surgical invention at this time. Cam boot in place. Follow-up outpatient next week. -Patient having pain in bilateral ankles. Unable to ambulate well on her own despite crutches and there are 5 steps that lead into her house.   Will work with PT/OT today prior to going home.  -No further imaging needed on tertiary exam.  Trauma surgery to sign off.    Alice Mauro MD

## 2023-03-17 NOTE — ED NOTES
Report given to [facility] RN     Valentín, OMAR  03/16/23 [id number]

## 2023-03-17 NOTE — PROGRESS NOTES
[address]  CDU / OBSERVATION ENCOUNTER  ATTENDING NOTE       I performed a history and physical examination of the patient and discussed management with the resident or midlevel provider. I reviewed the resident or midlevel provider's note and agree with the documented findings and plan of care. Any areas of disagreement are noted on the chart. I was personally present for the key portions of any procedures. I have documented in the chart those procedures where I was not present during the key portions. I have reviewed the nurses notes. I agree with the chief complaint, past medical history, past surgical history, allergies, medications, social and family history as documented unless otherwise noted below. The Family history, social history, and ROS are effectively unchanged since admission unless noted elsewhere in the chart. This patient was placed in the observation unit for reevaluation for possible admission to the hospital    She requires a shower chair in order to facilitate bathing without the use of a shower chair she would not be able to perform the daily tasks. Patient and I had discussion regarding her bathing with a shower chair and he understands and is in agreement      Cheryle Guillaume was evaluated today and a DME order was entered for a wheeled walker because she requires this to successfully complete daily living tasks of ambulating. A wheeled walker is necessary due to the patient's unsteady gait, upper body weakness, and inability to  an ambulation device; and she can ambulate only by pushing a walker instead of a lesser assistive device such as a cane, crutch, or standard walker. The need for this equipment was discussed with the patient and she understands and is in agreement.      Bertell Curling MD  Attending Emergency  Physician

## 2023-03-17 NOTE — CARE COORDINATION
Met with pt to complete SBIRT. Pt states that she has an occasional drink once a month but denies excessive use. Pt denies drug use and also denies depression/SI. Alcohol Screening and Brief Intervention        No results for input(s): ALC in the last 72 hours. Alcohol Pre-screening     (WOMEN ONLY) How many times in the past year have you had 4 or more drinks in a day?: None       Drug Pre-Screening   How many times in the past year have you used a recreational drug or used a prescription medication for nonmedical reasons?: None    Drug Screening DAST       Mood Pre-Screening (PHQ-2)  During the past two weeks, have you been bothered by little interest or pleasure in doing things?: No  During the past two weeks, have you been bothered by feeling down, depressed, or hopeless?: No    Mood Pre-Screening (PHQ-9)         I have interviewed Forest Farooq, [id number] regarding  Her alcohol consumption/drug use and risk for excessive use. Screenings were negative. Patient  N/A intervention at this time.      Deferred []    Completed on: 3/17/2023   [facility]

## 2023-03-17 NOTE — CARE COORDINATION
Patient has DME ordered and states that they will use [facility] for her DME needs. Order, face sheet, and face to face note are faxed to [facility]. Writer then followed up over the phone to find that all DME are in stock and approved by insurance. Patient is notified to call when she gets home to arrange for  / Delivery. Patient agrees.

## 2023-03-17 NOTE — DISCHARGE SUMMARY
CDU Discharge Summary        Patient:  May Muñoz  YOB: 1970    MRN: [id number]   Acct: [de-identified]    Primary Care Physician: No primary care provider on file. Admit date:  3/16/2023  3:15 PM  Discharge date: 3:03 PM 3/17/2023    Discharge Diagnoses:     1.) Acute Left distal fibular fracture (Ga C). Orthopedics consulted recommend CAM boot, crutches, walker, weight bearing as tolerated. Outpatient follow up on 3/21. Patient received pain medication with improvement. 2.) acute right ATF ligament tear, Orthopedics consulted recommend CAM boot, crutches, walker, weight bearing as tolerated. Outpatient follow up on 3/21. Patient received pain medication with improvement. 3.) Acute L superior pubic rami cortical irregularity, Orthopedics consulted recommend no intervention. Outpatient follow up on 3/21. Patient received pain medication with improvement. Follow-up:  Call today/tomorrow for a follow up appointment with No primary care provider on file. , or return to the Emergency Room with worsening symptoms    Stressed to patient the importance of following up with primary care doctor for further workup/management of symptoms. Pt verbalizes understanding and agrees with plan.     Discharge Medication Changes:       Medication List        START taking these medications      acetaminophen 500 MG tablet  Commonly known as: TYLENOL  Take 2 tablets by mouth every 6 hours as needed for Pain     cyclobenzaprine 5 MG tablet  Commonly known as: FLEXERIL  Take 1 tablet by mouth 2 times daily as needed for Muscle spasms     vitamin D 1.25 MG (48274 UT) Caps capsule  Commonly known as: ERGOCALCIFEROL  Take 1 capsule by mouth once a week for 8 doses            CONTINUE taking these medications      hydroCHLOROthiazide 25 MG tablet  Commonly known as: HYDRODIURIL     latanoprost 0.005 % ophthalmic solution  Commonly known as: XALATAN     loratadine 10 MG capsule  Commonly known as: CLARITIN montelukast 10 MG tablet  Commonly known as: SINGULAIR     pantoprazole 40 MG tablet  Commonly known as: PROTONIX     vitamin D 50 MCG (2000 UT) Caps capsule               Where to Get Your Medications        These medications were sent to [facility] [id number] [facility], [address] [facility]  [address], [address] [address]      Phone: [phone]   acetaminophen 500 MG tablet  cyclobenzaprine 5 MG tablet  vitamin D 1.25 MG (54001 UT) Caps capsule         Diet:  ADULT DIET; Regular, advance as tolerated     Activity:  As tolerated    Consultants: IP CONSULT TO ORTHOPEDIC SURGERY  IP CONSULT TO TRAUMA SURGERY    Procedures:  Not indicated     Diagnostic Test:   Results for orders placed or performed during the hospital encounter of 03/16/23   COVID-19, Rapid    Specimen: Nasopharyngeal Swab   Result Value Ref Range    Specimen Description . NASOPHARYNGEAL SWAB     SARS-CoV-2, Rapid Not Detected Not Detected     XR PELVIS (1-2 VIEWS)    Result Date: 3/16/2023  EXAMINATION: ONE XRAY VIEW OF THE PELVIS 3/16/2023 9:55 pm COMPARISON: 2 hours earlier. HISTORY: ORDERING SYSTEM PROVIDED HISTORY: Maggie 11, outlet TECHNOLOGIST PROVIDED HISTORY: [place], outlet FINDINGS: No fracture, dislocation or joint abnormality identified. Bone density is normal.  Fracture suspected at the left superior pubic ramus on the earlier exam is not evident on the current exam.  Surrounding soft tissues are unremarkable. No acute abnormality evident. If there is further clinical concern, CT could be performed. XR PELVIS (1-2 VIEWS)    Result Date: 3/16/2023  EXAMINATION: ONE XRAY VIEW OF THE PELVIS 3/16/2023 7:36 pm COMPARISON: None. HISTORY: ORDERING SYSTEM PROVIDED HISTORY: Fall - trauma TECHNOLOGIST PROVIDED HISTORY: Fall - trauma FINDINGS: Irregularity of the left superior pubic ramus is noted, which may represent a minimally displaced fracture. Pelvic alignment is maintained.   No evidence for proximal femoral fracture. Contour abnormality of the left superior pubic ramus, for which a underlying fracture is suspected. Correlation with CT is suggested. XR TIBIA FIBULA LEFT (2 VIEWS)    Result Date: 3/16/2023  EXAMINATION: 2 XRAY VIEWS OF THE LEFT ANKLE; 3 XRAY VIEWS OF THE LEFT TIBIA AND FIBULA 3/16/2023 5:32 pm COMPARISON: None HISTORY: ORDERING SYSTEM PROVIDED HISTORY: Trauma/Fracture TECHNOLOGIST PROVIDED HISTORY: Trauma/Fracture gravity stress view please FINDINGS: The proximal left tibia and fibula are intact. Alignment at the knee joint is normal.  No acute periosteal reaction. Joint alignment is maintained. Redemonstration of a nondisplaced, obliquely oriented fracture of the distal fibula. Alignment is stable. On stress views, joint alignment is maintained. 1. Stable, acute nondisplaced oblique fracture of the distal fibula. 2. No evidence of instability on the stress views. XR ANKLE LEFT (2 VIEWS)    Result Date: 3/16/2023  EXAMINATION: 2 XRAY VIEWS OF THE LEFT ANKLE; 3 XRAY VIEWS OF THE LEFT TIBIA AND FIBULA 3/16/2023 5:32 pm COMPARISON: None HISTORY: ORDERING SYSTEM PROVIDED HISTORY: Trauma/Fracture TECHNOLOGIST PROVIDED HISTORY: Trauma/Fracture gravity stress view please FINDINGS: The proximal left tibia and fibula are intact. Alignment at the knee joint is normal.  No acute periosteal reaction. Joint alignment is maintained. Redemonstration of a nondisplaced, obliquely oriented fracture of the distal fibula. Alignment is stable. On stress views, joint alignment is maintained. 1. Stable, acute nondisplaced oblique fracture of the distal fibula. 2. No evidence of instability on the stress views.      XR ANKLE LEFT (MIN 3 VIEWS)    Result Date: 3/16/2023  EXAMINATION: THREE XRAY VIEWS OF THE LEFT ANKLE; THREE XRAY VIEWS OF THE RIGHT ANKLE; THREE XRAY VIEWS OF THE RIGHT FOOT; THREE XRAY VIEWS OF THE LEFT FOOT 3/16/2023 3:32 pm COMPARISON: None [id number] PROVIDED HISTORY: Fall, lateral malleolus tenderness TECHNOLOGIST PROVIDED HISTORY: Fall, lateral malleolus tenderness FINDINGS: Left ankle: There is an acute nondisplaced oblique fracture of the distal fibula, above the level of the ankle mortise. There is mild widening of the medial clear space, measuring 0.5 cm. Ankle mortise alignment is otherwise preserved. Posterior malleolus is grossly intact. Small plantar calcaneal spur and insertional Achilles enthesophyte noted. Left foot: No additional fracture or evidence of dislocation. Right ankle: Soft tissues are within normal limits. There is no acute fracture or dislocation. Ankle mortise alignment is preserved. Right foot: Soft tissues are within normal limits. No acute fracture or dislocation. 1.  Left ankle and foot: Acute nondisplaced fracture of the distal fibula (Ga C) with mild widening of the medial clear space. 2.  Right ankle and foot: No acute osseous abnormality. XR ANKLE RIGHT (MIN 3 VIEWS)    Result Date: 3/16/2023  EXAMINATION: THREE XRAY VIEWS OF THE LEFT ANKLE; THREE XRAY VIEWS OF THE RIGHT ANKLE; THREE XRAY VIEWS OF THE RIGHT FOOT; THREE XRAY VIEWS OF THE LEFT FOOT 3/16/2023 3:32 pm COMPARISON: None HISTORY: ORDERING SYSTEM PROVIDED HISTORY: Fall, lateral malleolus tenderness TECHNOLOGIST PROVIDED HISTORY: Fall, lateral malleolus tenderness FINDINGS: Left ankle: There is an acute nondisplaced oblique fracture of the distal fibula, above the level of the ankle mortise. There is mild widening of the medial clear space, measuring 0.5 cm. Ankle mortise alignment is otherwise preserved. Posterior malleolus is grossly intact. Small plantar calcaneal spur and insertional Achilles enthesophyte noted. Left foot: No additional fracture or evidence of dislocation. Right ankle: Soft tissues are within normal limits. There is no acute fracture or dislocation. Ankle mortise alignment is preserved.  Right foot: Soft tissues are within normal limits. No acute fracture or dislocation. 1.  Left ankle and foot: Acute nondisplaced fracture of the distal fibula (Ga C) with mild widening of the medial clear space. 2.  Right ankle and foot: No acute osseous abnormality. XR FOOT LEFT (MIN 3 VIEWS)    Result Date: 3/16/2023  EXAMINATION: THREE XRAY VIEWS OF THE LEFT ANKLE; THREE XRAY VIEWS OF THE RIGHT ANKLE; THREE XRAY VIEWS OF THE RIGHT FOOT; THREE XRAY VIEWS OF THE LEFT FOOT 3/16/2023 3:32 pm COMPARISON: None HISTORY: ORDERING SYSTEM PROVIDED HISTORY: Fall, lateral malleolus tenderness TECHNOLOGIST PROVIDED HISTORY: Fall, lateral malleolus tenderness FINDINGS: Left ankle: There is an acute nondisplaced oblique fracture of the distal fibula, above the level of the ankle mortise. There is mild widening of the medial clear space, measuring 0.5 cm. Ankle mortise alignment is otherwise preserved. Posterior malleolus is grossly intact. Small plantar calcaneal spur and insertional Achilles enthesophyte noted. Left foot: No additional fracture or evidence of dislocation. Right ankle: Soft tissues are within normal limits. There is no acute fracture or dislocation. Ankle mortise alignment is preserved. Right foot: Soft tissues are within normal limits. No acute fracture or dislocation. 1.  Left ankle and foot: Acute nondisplaced fracture of the distal fibula (Ga C) with mild widening of the medial clear space. 2.  Right ankle and foot: No acute osseous abnormality. XR FOOT RIGHT (MIN 3 VIEWS)    Result Date: 3/16/2023  EXAMINATION: THREE XRAY VIEWS OF THE LEFT ANKLE; THREE XRAY VIEWS OF THE RIGHT ANKLE; THREE XRAY VIEWS OF THE RIGHT FOOT; THREE XRAY VIEWS OF THE LEFT FOOT 3/16/2023 3:32 pm COMPARISON: None HISTORY: ORDERING SYSTEM PROVIDED HISTORY: Fall, lateral malleolus tenderness TECHNOLOGIST PROVIDED HISTORY: Fall, lateral malleolus tenderness FINDINGS: Left ankle:  There is an acute nondisplaced oblique fracture of the distal fibula, above the level of the ankle mortise. There is mild widening of the medial clear space, measuring 0.5 cm. Ankle mortise alignment is otherwise preserved. Posterior malleolus is grossly intact. Small plantar calcaneal spur and insertional Achilles enthesophyte noted. Left foot: No additional fracture or evidence of dislocation. Right ankle: Soft tissues are within normal limits. There is no acute fracture or dislocation. Ankle mortise alignment is preserved. Right foot: Soft tissues are within normal limits. No acute fracture or dislocation. 1.  Left ankle and foot: Acute nondisplaced fracture of the distal fibula (Ga C) with mild widening of the medial clear space. 2.  Right ankle and foot: No acute osseous abnormality. Physical Exam:    General appearance - NAD, AOx 3   Lungs -CTAB, no R/R/R  Heart - RRR, no M/R/G  Abdomen - Soft, NT/ND  Neurological:  MAEx4, No focal motor deficit, sensory loss  Extremities - Cap refil <2 sec in all ext., mild R ankle edema. Bilateral ankle tenderness to palpation, R>L  Skin -warm, dry      Hospital Course:  Clinical course has improved, labs and imaging reviewed. Candace Wise originally presented to the hospital on 3/16/2023  3:15 PM with bilateral ankle pain after mechanical fall down 1 step. At that time it was determined that She required further observation and orthopedic evaluation and working with PT to walk on crutches/walker in CAM boots. Patient tolerated well and is discharged to home with boots, crutches, and walker. Also discharged with shower stool. Labs and imaging were followed daily. Imaging results as above. She is medically stable to be discharged. Disposition: Home    Patient stated that they will not drive themselves home from the hospital if they have gotten pain killers/ narcotics earlier that day and that they will arrange for transportation on their own or work with the  for a ride. Patient counseled NOT to drive while under the influence of narcotics/ pain killers.     Condition: Good    Patient stable and ready for discharge home. I have discussed plan of care with patient and they are in understanding. They were instructed to read discharge paperwork. All of their questions and concerns were addressed.     Time Spent: 1day      --  Lavinia Caldera DO  Emergency Medicine Resident Physician    This dictation was generated by voice recognition computer software.  Although all attempts are made to edit the dictation for accuracy, there may be errors in the transcription that are not intended.

## 2023-03-17 NOTE — H&P
[facility] [employer]  CDU / OBSERVATION ENCOUNTER     Pt Name: Mya Muñoz  MRN: [id number]  [place] 1970  Date of evaluation: 3/17/23  Patient's PCP is : No primary care provider on file. CHIEF COMPLAINT       Chief Complaint   Patient presents with    Ankle Injury         HISTORY OF PRESENT ILLNESS    Mya Muñoz is a 47 y.o. female w/ PMHx of ulcers,  who presents with bilateral ankle pain after a mechanical fall yesterday off a stair. Patient found to have L nondisplaced fx of distal fibula, R ATF ligament sprain, L superior pubic rami cortical irregularity. Patient was evaluated by Ortho and placed in bilateral CAM boots and crutches. Since admission patient VSS    Location/Symptom: bilateral ankles, R>L  Timing/Onset: yesterday  Provocation: worse with movement, weight bearing  Quality: aching  Radiation: none  Severity: moderate  Timing/Duration: intermittent  Modifying Factors: improved with rest    History was obtained in part through review of the ED chart. When possible, a direct discussion was had with ED nurses, residents, and attendings  REVIEW OF SYSTEMS       General ROS - No fevers, No malaise   Ophthalmic ROS - No discharge, No changes in vision  ENT ROS -  No sore throat, No rhinorrhea,   Respiratory ROS - no shortness of breath, no cough, no  wheezing  Cardiovascular ROS - No chest pain, no dyspnea on exertion  Gastrointestinal ROS - No abdominal pain, no nausea or vomiting, no change in bowel habits, no black or bloody stools  Genito-Urinary ROS - No dysuria, trouble voiding, or hematuria  Musculoskeletal ROS - No myalgias, + arthalgias  Neurological ROS - No headache, no dizziness/lightheadedness, No focal weakness, no loss of sensation  Dermatological ROS - No lesions, No rash     (PQRS) Advance directives on face sheet per hospital policy. No change unless specifically mentioned in chart    PAST MEDICAL HISTORY    has no past medical history on file.     I have reviewed the past medical history with the patient and it is pertinent to this complaint. SURGICAL HISTORY      has no past surgical history on file. I have reviewed and agree with Surgical History entered and it is pertinent to this complaint. CURRENT MEDICATIONS     hydroCHLOROthiazide (HYDRODIURIL) tablet 25 mg, Daily  sodium chloride flush 0.9 % injection 5-40 mL, 2 times per day  sodium chloride flush 0.9 % injection 5-40 mL, PRN  0.9 % sodium chloride infusion, PRN  enoxaparin (LOVENOX) injection 40 mg, Daily  acetaminophen (TYLENOL) tablet 650 mg, Q4H PRN  ondansetron (ZOFRAN-ODT) disintegrating tablet 4 mg, Q8H PRN   Or  ondansetron (ZOFRAN) injection 4 mg, Q6H PRN  acetaminophen (TYLENOL) tablet 1,000 mg, Q6H  oxyCODONE (ROXICODONE) immediate release tablet 5 mg, Q6H PRN        All medication charted and reviewed. ALLERGIES     is allergic to ibuprofen and morphine. FAMILY HISTORY     has no family status information on file. family history is not on file. The patient denies any pertinent family history. I have reviewed and agree with the family history entered. I have reviewed the Family History and it is not significant to the case    SOCIAL HISTORY      reports that she has never smoked. She uses smokeless tobacco. She reports that she does not currently use alcohol. She reports that she does not currently use drugs. I have reviewed and agree with all Social.  There are no concerns for substance abuse/use. PHYSICAL EXAM     INITIAL VITALS:  height is 5' 3\" (1.6 m) and weight is 181 lb (82.1 kg). Her oral temperature is 97.2 °F (36.2 °C). Her blood pressure is 177/87 (abnormal) and her pulse is 75. Her respiration is 18 and oxygen saturation is 98%.       CONSTITUTIONAL: AOx4, no apparent distress, appears stated age    HEAD: normocephalic, atraumatic   EYES: PERRLA, EOMI    ENT: moist mucous membranes, uvula midline   NECK: supple, symmetric   BACK: symmetric   LUNGS: clear to auscultation bilaterally   CARDIOVASCULAR: regular rate and rhythm, no murmurs, rubs or gallops   ABDOMEN: soft, non-tender, non-distended   NEUROLOGIC:  MAEx4, no focal sensory or motor deficits. R ankle tenderness to palpation, ROM intact, limited by pain    MUSCULOSKELETAL: no clubbing, cyanosis or edema   SKIN: no rash or wounds       DIFFERENTIAL DIAGNOSIS/MDM:     FROM ED MEDICAL DECISION MAKING NOTE:   70-year-old female who presents for evaluation of bilateral ankle pain following fall. Patient slightly hypertensive but vitals otherwise unremarkable. On exam patient appears uncomfortable but nontoxic. Heart RRR, lungs clear. No midline spinal tenderness. No focal neurologic deficits. Low risk for head injury given [place] head CT criteria. Low risk for C-spine injury via [place] C-spine criteria. Tenderness and swelling of bilateral lateral malleoli, left greater than right. Neurovascularly intact. Will obtain bilateral foot/ankle x-rays. Percocet for pain. DIAGNOSTIC RESULTS     RADIOLOGY:   I directly visualized the following  images and reviewed the radiologist interpretations:    XR PELVIS (1-2 VIEWS)    Result Date: 3/16/2023  EXAMINATION: ONE XRAY VIEW OF THE PELVIS 3/16/2023 9:55 pm COMPARISON: 2 hours earlier. HISTORY: ORDERING SYSTEM PROVIDED HISTORY: [address], outlet TECHNOLOGIST PROVIDED HISTORY: [place], outlet FINDINGS: No fracture, dislocation or joint abnormality identified. Bone density is normal.  Fracture suspected at the left superior pubic ramus on the earlier exam is not evident on the current exam.  Surrounding soft tissues are unremarkable. No acute abnormality evident. If there is further clinical concern, CT could be performed. XR PELVIS (1-2 VIEWS)    Result Date: 3/16/2023  EXAMINATION: ONE XRAY VIEW OF THE PELVIS 3/16/2023 7:36 pm COMPARISON: None.  HISTORY: ORDERING SYSTEM PROVIDED HISTORY: Fall - trauma TECHNOLOGIST PROVIDED HISTORY: Fall - trauma FINDINGS: Irregularity of the left superior pubic ramus is noted, which may represent a minimally displaced fracture. Pelvic alignment is maintained. No evidence for proximal femoral fracture. Contour abnormality of the left superior pubic ramus, for which a underlying fracture is suspected. Correlation with CT is suggested. XR TIBIA FIBULA LEFT (2 VIEWS)    Result Date: 3/16/2023  EXAMINATION: 2 XRAY VIEWS OF THE LEFT ANKLE; 3 XRAY VIEWS OF THE LEFT TIBIA AND FIBULA 3/16/2023 5:32 pm COMPARISON: None HISTORY: ORDERING SYSTEM PROVIDED HISTORY: Trauma/Fracture TECHNOLOGIST PROVIDED HISTORY: Trauma/Fracture gravity stress view please FINDINGS: The proximal left tibia and fibula are intact. Alignment at the knee joint is normal.  No acute periosteal reaction. Joint alignment is maintained. Redemonstration of a nondisplaced, obliquely oriented fracture of the distal fibula. Alignment is stable. On stress views, joint alignment is maintained. 1. Stable, acute nondisplaced oblique fracture of the distal fibula. 2. No evidence of instability on the stress views. XR ANKLE LEFT (2 VIEWS)    Result Date: 3/16/2023  EXAMINATION: 2 XRAY VIEWS OF THE LEFT ANKLE; 3 XRAY VIEWS OF THE LEFT TIBIA AND FIBULA 3/16/2023 5:32 pm COMPARISON: None HISTORY: ORDERING SYSTEM PROVIDED HISTORY: Trauma/Fracture TECHNOLOGIST PROVIDED HISTORY: Trauma/Fracture gravity stress view please FINDINGS: The proximal left tibia and fibula are intact. Alignment at the knee joint is normal.  No acute periosteal reaction. Joint alignment is maintained. Redemonstration of a nondisplaced, obliquely oriented fracture of the distal fibula. Alignment is stable. On stress views, joint alignment is maintained. 1. Stable, acute nondisplaced oblique fracture of the distal fibula. 2. No evidence of instability on the stress views.      XR ANKLE LEFT (MIN 3 VIEWS)    Result Date: 3/16/2023  EXAMINATION: THREE XRAY VIEWS OF THE LEFT ANKLE; THREE XRAY VIEWS OF THE RIGHT ANKLE; THREE XRAY VIEWS OF THE RIGHT FOOT; THREE XRAY VIEWS OF THE LEFT FOOT 3/16/2023 3:32 pm COMPARISON: None HISTORY: ORDERING SYSTEM PROVIDED HISTORY: Fall, lateral malleolus tenderness TECHNOLOGIST PROVIDED HISTORY: Fall, lateral malleolus tenderness FINDINGS: Left ankle: There is an acute nondisplaced oblique fracture of the distal fibula, above the level of the ankle mortise. There is mild widening of the medial clear space, measuring 0.5 cm. Ankle mortise alignment is otherwise preserved. Posterior malleolus is grossly intact. Small plantar calcaneal spur and insertional Achilles enthesophyte noted. Left foot: No additional fracture or evidence of dislocation. Right ankle: Soft tissues are within normal limits. There is no acute fracture or dislocation. Ankle mortise alignment is preserved. Right foot: Soft tissues are within normal limits. No acute fracture or dislocation. 1.  Left ankle and foot: Acute nondisplaced fracture of the distal fibula (Ga C) with mild widening of the medial clear space. 2.  Right ankle and foot: No acute osseous abnormality. XR ANKLE RIGHT (MIN 3 VIEWS)    Result Date: 3/16/2023  EXAMINATION: THREE XRAY VIEWS OF THE LEFT ANKLE; THREE XRAY VIEWS OF THE RIGHT ANKLE; THREE XRAY VIEWS OF THE RIGHT FOOT; THREE XRAY VIEWS OF THE LEFT FOOT 3/16/2023 3:32 pm COMPARISON: None HISTORY: ORDERING SYSTEM PROVIDED HISTORY: Fall, lateral malleolus tenderness TECHNOLOGIST PROVIDED HISTORY: Fall, lateral malleolus tenderness FINDINGS: Left ankle: There is an acute nondisplaced oblique fracture of the distal fibula, above the level of the ankle mortise. There is mild widening of the medial clear space, measuring 0.5 cm. Ankle mortise alignment is otherwise preserved. Posterior malleolus is grossly intact. Small plantar calcaneal spur and insertional Achilles enthesophyte noted. Left foot: No additional fracture or evidence of dislocation.  Right ankle: Soft tissues are within normal limits. There is no acute fracture or dislocation. Ankle mortise alignment is preserved. Right foot: Soft tissues are within normal limits. No acute fracture or dislocation. 1.  Left ankle and foot: Acute nondisplaced fracture of the distal fibula (Ga C) with mild widening of the medial clear space. 2.  Right ankle and foot: No acute osseous abnormality. XR FOOT LEFT (MIN 3 VIEWS)    Result Date: 3/16/2023  EXAMINATION: THREE XRAY VIEWS OF THE LEFT ANKLE; THREE XRAY VIEWS OF THE RIGHT ANKLE; THREE XRAY VIEWS OF THE RIGHT FOOT; THREE XRAY VIEWS OF THE LEFT FOOT 3/16/2023 3:32 pm COMPARISON: None HISTORY: ORDERING SYSTEM PROVIDED HISTORY: Fall, lateral malleolus tenderness TECHNOLOGIST PROVIDED HISTORY: Fall, lateral malleolus tenderness FINDINGS: Left ankle: There is an acute nondisplaced oblique fracture of the distal fibula, above the level of the ankle mortise. There is mild widening of the medial clear space, measuring 0.5 cm. Ankle mortise alignment is otherwise preserved. Posterior malleolus is grossly intact. Small plantar calcaneal spur and insertional Achilles enthesophyte noted. Left foot: No additional fracture or evidence of dislocation. Right ankle: Soft tissues are within normal limits. There is no acute fracture or dislocation. Ankle mortise alignment is preserved. Right foot: Soft tissues are within normal limits. No acute fracture or dislocation. 1.  Left ankle and foot: Acute nondisplaced fracture of the distal fibula (Ga C) with mild widening of the medial clear space. 2.  Right ankle and foot: No acute osseous abnormality.      XR FOOT RIGHT (MIN 3 VIEWS)    Result Date: 3/16/2023  EXAMINATION: THREE XRAY VIEWS OF THE LEFT ANKLE; THREE XRAY VIEWS OF THE RIGHT ANKLE; THREE XRAY VIEWS OF THE RIGHT FOOT; THREE XRAY VIEWS OF THE LEFT FOOT 3/16/2023 3:32 pm COMPARISON: None HISTORY: ORDERING SYSTEM PROVIDED HISTORY: Fall, lateral malleolus tenderness TECHNOLOGIST PROVIDED HISTORY: Fall, lateral malleolus tenderness FINDINGS: Left ankle: There is an acute nondisplaced oblique fracture of the distal fibula, above the level of the ankle mortise. There is mild widening of the medial clear space, measuring 0.5 cm. Ankle mortise alignment is otherwise preserved. Posterior malleolus is grossly intact. Small plantar calcaneal spur and insertional Achilles enthesophyte noted. Left foot: No additional fracture or evidence of dislocation. Right ankle: Soft tissues are within normal limits. There is no acute fracture or dislocation. Ankle mortise alignment is preserved. Right foot: Soft tissues are within normal limits. No acute fracture or dislocation. 1.  Left ankle and foot: Acute nondisplaced fracture of the distal fibula (Ga C) with mild widening of the medial clear space. 2.  Right ankle and foot: No acute osseous abnormality. LABS:  I have reviewed and interpreted all available lab results. Labs Reviewed   COVID-19, RAPID         CDU IMPRESSION / PLAN      Hilario Carolina is a 47 y.o. female who presents with bilateral ankle pain s/p mechanical fall down a stair      Left distal fibular fracture (Ga C), Right anterior talofibular ligament sprain, Left superior pubic rami cortical irregularity.   - Ortho consulted, placed in bilateral CAM boots and crutches. WBAT. Outpatient follow up on 3/21.   - PT/OT  - pain management     Continue home medications and pain control  Monitor vitals, labs, and imaging  DISPO: pending consults and clinical improvement    CONSULTS:    IP CONSULT TO ORTHOPEDIC SURGERY  IP CONSULT TO TRAUMA SURGERY    PROCEDURES:  Not indicated       PATIENT REFERRED TO:    No follow-up provider specified. --  Karla Ramirez DO   Emergency Medicine     This dictation was generated by voice recognition computer software.   Although all attempts are made to edit the dictation for accuracy, there may be errors in the transcription that are not intended.

## 2023-03-17 NOTE — PROGRESS NOTES
Physical Therapy  Facility/Department: [address] OBSERVATION  Physical Therapy Initial Assessment    Name: Hilario Carolina  : 1970  MRN: [id number]  Date of Service: 3/17/2023  Fall down one stair, -LOC, -AC, left distal fibular fx, sprained right ankle  -Ortho consulted. No surgical intervention at this time. Placed in CAM boot and provided with crutches. WBAT BLE. Discharge Recommendations:  No therapy recommended at discharge   PT Equipment Recommendations  Equipment Needed: Yes  Mobility Devices: Veto Finical: Rolling      Patient Diagnosis(es): The primary encounter diagnosis was Closed fracture of distal end of left fibula, unspecified fracture morphology, initial encounter. A diagnosis of Fall from bed, initial encounter was also pertinent to this visit. Past Medical History:  has no past medical history on file. Past Surgical History:  has no past surgical history on file. Assessment   Assessment: The pt ambulated 35 ft with a RW x SBA with WBAT B LE's with B cam boots. She ambulated safely and is being dc'd home today.  No further PT intervention is needed at this time  Therapy Prognosis: Good  Decision Making: Medium Complexity  Requires PT Follow-Up: No     Plan   Physcial Therapy Plan  General Plan: Discharge with evaluation only  Safety Devices  Type of Devices: Nurse notified, Left in bed, Call light within reach  Restraints  Restraints Initially in Place: No     Restrictions  Restrictions/Precautions  Restrictions/Precautions: Weight Bearing  Required Braces or Orthoses?: Yes  Lower Extremity Weight Bearing Restrictions  Right Lower Extremity Weight Bearing: Weight Bearing As Tolerated  Left Lower Extremity Weight Bearing: Weight Bearing As Tolerated  Required Braces or Orthoses  Right Lower Extremity Brace: Boot  RLE Brace Type: cam boot  Left Lower Extremity Brace: Boot  LLE Brace Type: cam boot     Subjective   General  Patient assessed for rehabilitation services?: Yes  Response To Previous Treatment: Not applicable  Family / Caregiver Present: Yes ( present)  Follows Commands: Within Functional Limits  Subjective  Subjective: Pt c/o 5/10 pain L LE, 3/10 pain R LE         Social/Functional History  Social/Functional History  Type of Home: House (lower duplex)  Home Layout: One level  Home Access: Stairs to enter without rails  Entrance Stairs - Number of Steps: 5  Bathroom Shower/Tub: Tub/Shower unit  Bathroom Toilet: Standard  Home Equipment:  (No DME at baseline)  ADL Assistance: [address]: Independent  Homemaking Responsibilities: Yes  Ambulation Assistance: Independent  Active : Yes  Mode of Transportation: [employer]  Occupation: Full time employment  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  Leisure & Hobbies: Movies, time with family, friends  Vision/Hearing  Vision  Vision: Impaired  Vision Exceptions: Wears glasses at all times  Hearing  Hearing: Within functional limits    Cognition   Orientation  Overall Orientation Status: Within Functional Limits  Orientation Level: Oriented X4  Cognition  Overall Cognitive Status: WFL     Objective   Heart Rate: 63  Heart Rate Source: Monitor  BP: 116/83  MAP (Calculated): 94  Resp: 18  SpO2: 100 %  O2 Device: None (Room air)         AROM RLE (degrees)  RLE AROM: WFL  AROM LLE (degrees)  LLE AROM : WFL  AROM RUE (degrees)  RUE AROM : WNL  AROM LUE (degrees)  LUE AROM : WNL  Strength RLE  Strength RLE: WFL  Strength LLE  Strength LLE: WFL  Strength RUE  Strength RUE: WNL  Strength LUE  Strength LUE: WNL        Bed mobility  Supine to Sit: Stand by assistance  Sit to Supine: Stand by assistance  Scooting: Stand by assistance  Transfers  Sit to Stand: Stand by assistance  Stand to Sit: Stand by assistance  Ambulation  Surface: Level tile  Device: Rolling Walker  Assistance: Stand by assistance  Distance: amb 35 ft witha RW x SBA with WBAT B LE's, B cam boots     Balance  Posture: Good  Sitting - Static: Good  Sitting - Dynamic: Fair  Standing - Static: Fair  Standing - Dynamic: Fair     OutComes Score        AM-PAC Score  AM-PAC Inpatient Mobility Raw Score : 20 (03/17/23 1432)  AM-PAC Inpatient T-Scale Score : 47.67 (03/17/23 1432)  Mobility Inpatient CMS 0-100% Score: 35.83 (03/17/23 1432)  Mobility Inpatient CMS G-Code Modifier : CJ (03/17/23 1432)     Tinneti Score     Goals  Short Term Goals  Time Frame for Short Term Goals: The pt is being dc'fd home today. She is ambulating safely with a RW     DC   PT       Education  Patient Education  Education Given To: Patient  Education Provided: Role of Therapy;Plan of Care  Education Method: Verbal  Barriers to Learning: None  Education Outcome: Verbalized understanding      Therapy Time   Individual Concurrent Group Co-treatment   Time In 1336         Time Out 1400         Minutes 24                 Eloy Carmona, PT

## 2023-03-21 ENCOUNTER — OFFICE VISIT (OUTPATIENT)
Dept: ORTHOPEDIC SURGERY | Age: 53
End: 2023-03-21

## 2023-03-21 VITALS — HEIGHT: 63 IN | BODY MASS INDEX: 32.07 KG/M2 | WEIGHT: 181 LBS

## 2023-03-21 DIAGNOSIS — R52 PAIN: Primary | ICD-10-CM

## 2023-03-21 DIAGNOSIS — S82.832A OTHER CLOSED FRACTURE OF DISTAL END OF LEFT FIBULA, INITIAL ENCOUNTER: ICD-10-CM

## 2023-03-21 RX ORDER — HYDROCODONE BITARTRATE AND ACETAMINOPHEN 5; 325 MG/1; MG/1
1 TABLET ORAL EVERY 6 HOURS PRN
Qty: 28 TABLET | Refills: 0 | Status: SHIPPED | OUTPATIENT
Start: 2023-03-21 | End: 2023-03-28

## 2023-03-21 RX ORDER — TIZANIDINE 2 MG/1
2 TABLET ORAL 3 TIMES DAILY PRN
Qty: 30 TABLET | Refills: 1 | Status: SHIPPED | OUTPATIENT
Start: 2023-03-21

## 2023-03-21 NOTE — PROGRESS NOTES
the right ankle demonstrating excellent alignment of ankle joint. No fracture, dislocation or soft tissue abnormalities. Impression:   Unremarkable right ankle    ASSESSMENT:  47 y.o. female with left distal fibular fracture (Ga C) and right anterior talofibular ligament sprain. PLAN:  -Obtained new radiographs of bilateral ankles today and reviewed with patient. -WBAT BLE  -Continue to use CAM walker boots: left ankle to remain in CAM boot for 5 weeks, right ankle to remain in CAM boot for 1 additional week, and may progress without it as tolerated. -Gave prescription for 7 days of Norco (5/325, 28 pills dispense); Tizanidine (2mg TID, 30 pills dispense)  -Patient to follow up in 5 weeks  -Patient was agreeable to today's plan. Return in about 5 weeks (around 4/25/2023) for re-evaluation of bilateral ankle pain. Orders Placed This Encounter   Medications    HYDROcodone-acetaminophen (NORCO) 5-325 MG per tablet     Sig: Take 1 tablet by mouth every 6 hours as needed for Pain for up to 7 days. Intended supply: 7 days.  Take lowest dose possible to manage pain Max Daily Amount: 4 tablets     Dispense:  28 tablet     Refill:  0     Reduce doses taken as pain becomes manageable    tiZANidine (ZANAFLEX) 2 MG tablet     Sig: Take 1 tablet by mouth 3 times daily as needed (take before bed)     Dispense:  30 tablet     Refill:  1       Orders Placed This Encounter   Procedures    XR ANKLE LEFT (MIN 3 VIEWS)     Standing Status:   Future     Number of Occurrences:   1     Standing Expiration Date:   3/21/2024     Order Specific Question:   Reason for exam:     Answer:   monitor    XR ANKLE RIGHT (MIN 3 VIEWS)     Standing Status:   Future     Number of Occurrences:   1     Standing Expiration Date:   3/21/2024     Order Specific Question:   Reason for exam:     Answer:   monitor        Electronically signed by Ricardo Nieves DO on3/21/2023 at 9:01 AM

## 2023-04-01 ENCOUNTER — PATIENT MESSAGE (OUTPATIENT)
Dept: ORTHOPEDIC SURGERY | Age: 53
End: 2023-04-01

## 2023-04-03 NOTE — TELEPHONE ENCOUNTER
From: Slick Madsen  To: Dr. Blane Gaming: 4/1/2023 11:42 AM EDT  Subject: Swollen foot    You instructed me to try bearing weight on my left leg but its always swollen so how can this be done? I don't have any more pain and thought that's when I should start bearing weight but it's always swollen.

## 2023-04-25 ENCOUNTER — OFFICE VISIT (OUTPATIENT)
Dept: ORTHOPEDIC SURGERY | Age: 53
End: 2023-04-25

## 2023-04-25 VITALS — HEIGHT: 63 IN | WEIGHT: 181 LBS | BODY MASS INDEX: 32.07 KG/M2

## 2023-04-25 DIAGNOSIS — S82.832A OTHER CLOSED FRACTURE OF DISTAL END OF LEFT FIBULA, INITIAL ENCOUNTER: Primary | ICD-10-CM

## 2023-04-25 NOTE — PROGRESS NOTES
[address]   [id number] [facility] [id number]  Dept: [phone]  Dept Fax: [phone]        [facility] Follow Up      Subjective:   Date of Injury: 3/16/23    Jayce Garcia is a 47y.o. year old female who presents to the clinic today for routine followup regarding her left distal fibular fracture, right ankle sprain treated nonoperatively in bilateral cam boot. She was instructed to wear the cam boot for 1 week on her right ankle and wean out of it as tolerated. She discontinued it at 1 week after her visit with us. She does continue to have pain in the right ankle she states. She states she was unsure of the instructions and began walking in a normal shoe right away. She was to continue to use the cam boot on her left ankle at all times. She does remove the left ankle CAM boot when she sleeps and feels that the ankle \"flops around\". At this time her pain is well controlled and she is able to ambulate with a rolling walker at home. When she is out and about she prefers to utilize a wheelchair. No new trauma or falls. Denies any numbness or tingling in bilateral lower extremities. Presents with  today. Review of Systems  Gen: no fever, chills, malaise  CV: no chest pain or palpitations  Resp: no cough or shortness of breath  GI: no nausea, vomiting, diarrhea, or constipation  Neuro: no numbness, tingling, or weakness  Msk: Bilateral ankle pain and stiffness  10 remaining systems reviewed and negative    Objective : There were no vitals filed for this visit. Body mass index is 32.06 kg/m². General: No acute distress, resting comfortably in the clinic  Neuro: Alert, oriented  Eyes: Extra-ocular muscles intact  Pulm: Respirations unlabored and regular. Skin: Warm, well perfused  Psych: Patient has good fund of knowledge and displays understanging of exam, diagnosis, and plan.     MSK    RLE: Skin

## 2023-05-05 ENCOUNTER — HOSPITAL ENCOUNTER (OUTPATIENT)
Dept: PHYSICAL THERAPY | Age: 53
Setting detail: THERAPIES SERIES
Discharge: HOME | End: 2023-05-05
Payer: MEDICAID

## 2023-05-05 PROCEDURE — 97110 THERAPEUTIC EXERCISES: CPT

## 2023-05-05 PROCEDURE — 97161 PT EVAL LOW COMPLEX 20 MIN: CPT

## 2023-05-05 NOTE — CONSULTS
[x] [facility]) - [facility] &  Therapy  [address]  [phone]  F: [phone] [] [address]  [place] [address]   [address]  P: [phone]  F: [phone] [] [address]  Therapy  [address]  P: [phone]  F: [phone] [] [address]  P: [phone]  F: [phone] [] [address]  [facility]   [address]   [place]: [phone]  F: [phone]      Physical Therapy Lower Extremity Evaluation    Date:  2023  Patient: Yue Magdaleno  : 1970  MRN: [id number]  Physician: Dr. Prema Arce, DO/Dr. Hoda Marte: Nora Larson after eval  Medical Diagnosis: G87.505B (ICD-10-CM) - Other closed fracture of distal end of left fibula, initial encounter; adding S93. 401A for Sprain of unspecified ligament of right ankle   Rehab Codes: M 25.571, M 25.572, M 25.671, M 25.672, M 62.81, R 60.0, R 26.89  Onset date: 3/16/23    Next Dr's appt.: 23    Subjective:   CC:  Massage/vibration pad. Icing daily. Elevating leg. Difficulty walking. Feels stiff. Still swelling. Wearing crocs. After seeing doctor on 23, started using a cane. Has not started walking through grocery store. Taking baths (in/out of tub). Unsure if she is able to stand for a shower (unsure if she is steady) and has a shower chair in shower. Able to stand 30/45 minutes. Has not tried to vacuum. Has not resumed driving. HPI: (3/16/23)    3/16/23 - No plan for acute orthopedic surgical intervention at this time  -Placed in CAM boots bilaterally and given crutches to help ambulate  -Weightbearing Status: WBAT to Bilateral Lower Extremities    3/21/23- 47 F new patient being seen 5 days following fall down 1 step.  XR show left

## 2023-05-09 ENCOUNTER — HOSPITAL ENCOUNTER (OUTPATIENT)
Dept: PHYSICAL THERAPY | Age: 53
Setting detail: THERAPIES SERIES
Discharge: HOME | End: 2023-05-09
Payer: MEDICAID

## 2023-05-09 PROCEDURE — 97110 THERAPEUTIC EXERCISES: CPT

## 2023-05-09 NOTE — FLOWSHEET NOTE
[x] [address]  - [facility]ELVEST[facility] CENTER &  Therapy  [address]  [phone]  F: [phone] [] [address]  [place] [address]   [address]  P: [phone]  F: [phone] [] [address]  [address]  P: [phone]  F: [phone] [] [address]  P: [phone]  F: [phone] [] [address]  [facility]   [address]   [place]: [phone]  F: [phone]      Physical Therapy Daily Treatment Note    Date:  2023  Patient Name:  Lynsey Sutton    :  1970  MRN: [id number]  Physician: Dr. Akshat Lugo, DO/Dr. Elina Gipson: Verl Nai after eval  Medical Diagnosis: Y49.885T (ICD-10-CM) - Other closed fracture of distal end of left fibula, initial encounter; adding S93. 401A for Sprain of unspecified ligament of right ankle     Rehab Codes: M 25.571, M 25.572, M 25.671, M 25.672, M 62.81, R 60.0, R 26.89  Onset date: 3/16/23                 Next Dr's appt.: 23    Visit# / total visits: ; Cancels/No Shows: 0/0    Pain:  [x] Yes  [] No  Location: Left worse than right             Pain Rating: (0-10 scale) 0/10   Pain altered Tx:  [] Yes  [x] No  Action:  Comments: Patient arrives stating she is excited she fit a shoe on the L ankle. States no pain at arrival, just some discomfort last night.      Objective:  Modalities:   Precautions:  Exercises:  Exercise Reps/ Time Weight/ Level Comments   Nustep 5m L2          Standing      Gastroc stretch 3x20\"  Gentle on wedge   Heel/toe raise 15x ea  Rocking into comfortable ROM         Seated      Towel slides 15x     Heel/toe raise 20x ea     Ankle IN/EV on towel 20x ea      Calf stretch w/ strap 3x15\" ea     4 way ankle 15x ea Lime

## 2023-05-10 NOTE — PRE-CERTIFICATION NOTE
Mamta Ramirez .Flip.       [x] [facility]) - [facility] &  Therapy  [address]  [phone]  F: [phone] [] [address]  [facility]   [address]  P: [phone]  F: [phone] [] [place]  [address]  P: [phone]  F: [phone] [] [address]  [facility]   [address]   [place]: [phone]  F: [phone]  [] Diego Eddy   Outpatient Occupational Therapy  [address]: [phone]  F: [phone]          Therapy Pre-certification Note      5/10/2023    Elba Browning  1970   [id number]      Insurance approval was received for Physical Therapy from [facility] on 05/10/23. Approval was received for 18 visits, from 05/05/23 to 07/14/23. Authorization number [id number]. Patient was contacted to be scheduled .     88304 Approved 72 Units  50341 Approved 72 Units  67501 Approved 72 Units  81081 Approved 72 Units  06976 Approved 72 Units   13857 Approved 18 Units  93539 Approved 72 Units       Electronically signed by Aileen Litten on 5/10/2023 at 11:05 AM

## 2023-05-11 ENCOUNTER — HOSPITAL ENCOUNTER (OUTPATIENT)
Dept: PHYSICAL THERAPY | Age: 53
Setting detail: THERAPIES SERIES
Discharge: HOME | End: 2023-05-11
Payer: MEDICAID

## 2023-05-11 PROCEDURE — 97140 MANUAL THERAPY 1/> REGIONS: CPT

## 2023-05-11 PROCEDURE — 97110 THERAPEUTIC EXERCISES: CPT

## 2023-05-11 NOTE — FLOWSHEET NOTE
[x] [facility]) [facility] &  Therapy  [address]  [phone]  F: [phone] [] [address]  [facility]   [address]  P: [phone]  F: [phone] [] [address]  [address]  P: [phone]  F: [phone] [] [address]  P: [phone]  F: [phone] [] [address]  [facility]   [address]   [place]: [phone]  F: [phone]      Physical Therapy Daily Treatment Note    Date:  2023  Patient Name:  Maribell Prado    :  1970  MRN: [id number]  Physician: Dr. Martina Tracy DO/Dr. Jayson Arguello Ave: [place], 25 visits, from 23 to 23. Authorization number [id number]    33032 Approved 72 Units  68954 Approved 72 Units  54391 Approved 72 Units  79894 Approved 72 Units  59626 Approved 72 Units   28070 Approved 18 Units  22059 Approved 72 Units     Medical Diagnosis: H61.309D (ICD-10-CM) - Other closed fracture of distal end of left fibula, initial encounter; adding S93. 401A for Sprain of unspecified ligament of right ankle     Rehab Codes: M 25.571, M 25.572, M 25.671, M 25.672, M 62.81, R 60.0, R 26.89  Onset date: 3/16/23                 Next 's appt.: 23    Visit# / total visits: 3/18; Cancels/No Shows: 0/0    Pain:  [x] Yes  [] No  Location: Left worse than right             Pain Rating: (0-10 scale) 1/10 L ankle  Pain altered Tx:  [] Yes  [x] No  Action:  Comments:  Patient reports stiffness along the front of the ankle and out the outside. L leg feeling tired today.       Objective:  Modalities:   Precautions:  Exercises:  Exercise Reps/ Time Weight/ Level Comments   Nustep 5m L2          Standing      Gastroc stretch 3x20\"  Gentle on

## 2023-05-15 ENCOUNTER — HOSPITAL ENCOUNTER (OUTPATIENT)
Dept: PHYSICAL THERAPY | Age: 53
Setting detail: THERAPIES SERIES
Discharge: HOME | End: 2023-05-15
Payer: MEDICAID

## 2023-05-15 PROCEDURE — 97110 THERAPEUTIC EXERCISES: CPT

## 2023-05-15 PROCEDURE — 97140 MANUAL THERAPY 1/> REGIONS: CPT

## 2023-05-15 NOTE — FLOWSHEET NOTE
[x] [facility]) [facility] &  Therapy  [address]  [phone]  F: [phone] [] [address]  [place] [address]   [address]  P: [phone]  F: [phone] [] [address]  [address]  P: [phone]  F: [phone] [] [address]  P: [phone]  F: [phone] [] [address]  [facility]   [address]   [facility] [place] [facility]: [phone]  F: [phone]      Physical Therapy Daily Treatment Note    Date:  5/15/2023  Patient Name:  Ernestina Darden    :  1970  MRN: [id number]  Physician: Dr. Michael Thompson DO/Dr. Jayson Arguello Ave: [place], 25 visits, from 23 to 23. Authorization number [id number]    81964 Approved 72 Units  10275 Approved 72 Units  79817 Approved 72 Units  40824 Approved 72 Units  82370 Approved 72 Units   93591 Approved 18 Units  25869 Approved 72 Units     Medical Diagnosis: W49.425Z (ICD-10-CM) - Other closed fracture of distal end of left fibula, initial encounter; adding S93. 401A for Sprain of unspecified ligament of right ankle     Rehab Codes: M 25.571, M 25.572, M 25.671, M 25.672, M 62.81, R 60.0, R 26.89  Onset date: 3/16/23                 Next 's appt.: 23    Visit# / total visits: ; Cancels/No Shows: 0/0    Pain:  [x] Yes  [] No  Location: Left worse than right             Pain Rating: (0-10 scale) 1/10 L ankle  Pain altered Tx:  [] Yes  [x] No  Action:  Comments:  Patient reported that has no change in pain level since last session but it is low overall.      Objective:  Modalities:   Precautions:  Exercises:  Exercise Reps/ Time Weight/ Level Comments   Nustep 5m L2          Standing      Gastroc stretch 3x20\"  Gentle on wedge

## 2023-05-18 ENCOUNTER — HOSPITAL ENCOUNTER (OUTPATIENT)
Dept: PHYSICAL THERAPY | Age: 53
Setting detail: THERAPIES SERIES
Discharge: HOME | End: 2023-05-18
Payer: MEDICAID

## 2023-05-18 PROCEDURE — 97110 THERAPEUTIC EXERCISES: CPT

## 2023-05-22 ENCOUNTER — HOSPITAL ENCOUNTER (OUTPATIENT)
Dept: PHYSICAL THERAPY | Age: 53
Setting detail: THERAPIES SERIES
Discharge: HOME | End: 2023-05-22
Payer: MEDICAID

## 2023-05-22 PROCEDURE — 97140 MANUAL THERAPY 1/> REGIONS: CPT

## 2023-05-22 PROCEDURE — 97110 THERAPEUTIC EXERCISES: CPT

## 2023-05-22 NOTE — FLOWSHEET NOTE
[x] [facility] &  Therapy  [address]  [phone]  F: [phone]     Physical Therapy Daily Treatment Note    Date:  2023  Patient Name:  Rose Lewis      :  1970    MRN: [id number]  Physician: Dr. Pawel Arceo, DO/Dr. Jayson Arguello [address]: [place], 25 visits, from 23 to 23. Authorization number [id number]    21800 Approved 72 Units  81558 Approved 72 Units  20747 Approved 72 Units  92638 Approved 72 Units  80470 Approved 72 Units   34456 Approved 18 Units  72521 Approved 72 Units     Medical Diagnosis: P61.350H (ICD-10-CM) - Other closed fracture of distal end of left fibula, initial encounter; adding S93. 401A for Sprain of unspecified ligament of right ankle     Rehab Codes: M 25.571, M 25.572, M 25.671, M 25.672, M 62.81, R 60.0, R 26.89  Onset date: 3/16/23                 Next Dr's appt.: 23    Visit# / total visits:    Cancels/No Shows: 0/0    Pain:  [x] Yes  [] No  Location: Left worse than right             Pain Rating: (0-10 scale) 1/10 L ankle  Pain altered Tx:  [] Yes  [x] No  Action:  Comments: Patient reports she had a good weekend, walked around grocery store. Notes some pain in the front the ankle this morning. Has been working on her walk the past week.      Objective:  Modalities:   Precautions:  Exercises:  Exercise Reps/ Time Weight/ Level Comments   Treadmill 5m 1.3 mph Cues for step length, toes off and stance   Nustep  L2          Standing      Gastroc stretch 3x20\"  Gentle on wedge   Rockerboard 15x ea Level 1 Ant/post....added EV/IV 5.22   Heel/toe raise 20x ea  Rocking into comfortable ROM   Marches 20x     Hip abd 10x2 CKC    SLS 5x5\" R  5x1-2\" L     JOHN board 10x ea Level 2 Held for manual               Seated      Towel slides   Working on DF - keep heel down when sliding back   Ankle IV/EV on towel  L only Pull towel from side to side on ground   Calf

## 2023-05-25 ENCOUNTER — HOSPITAL ENCOUNTER (OUTPATIENT)
Dept: PHYSICAL THERAPY | Age: 53
Setting detail: THERAPIES SERIES
Discharge: HOME | End: 2023-05-25
Payer: MEDICAID

## 2023-05-25 PROCEDURE — 97110 THERAPEUTIC EXERCISES: CPT

## 2023-05-25 PROCEDURE — 97140 MANUAL THERAPY 1/> REGIONS: CPT

## 2023-05-25 NOTE — FLOWSHEET NOTE
- 1 x daily - 7 x weekly - 3 sets - 10 reps  - Seated Toe Towel Scrunches  - 1 x daily - 7 x weekly - 3 sets - 10 reps  - Seated Toe Raise  - 1 x daily - 7 x weekly - 3 sets - 10 reps  - Seated Heel Toe Raises  - 1 x daily - 7 x weekly - 3 sets - 10 reps  - Seated Ankle Dorsiflexion Stretch  - 1 x daily - 7 x weekly - 3 sets - 10 reps    Plan: [x] Continue current frequency toward long and short term goals.     [x] Specific Instructions for subsequent treatments: issue fall prevention, advance standing to steps, stairs        Time In: 1500            Time Out: 1558      Electronically signed by:  Ema Hartmann PTA

## 2023-05-30 ENCOUNTER — HOSPITAL ENCOUNTER (OUTPATIENT)
Dept: PHYSICAL THERAPY | Age: 53
Setting detail: THERAPIES SERIES
Discharge: HOME | End: 2023-05-30
Payer: MEDICAID

## 2023-05-30 PROCEDURE — 97016 VASOPNEUMATIC DEVICE THERAPY: CPT

## 2023-05-30 PROCEDURE — 97110 THERAPEUTIC EXERCISES: CPT

## 2023-05-30 PROCEDURE — 97140 MANUAL THERAPY 1/> REGIONS: CPT

## 2023-05-30 NOTE — FLOWSHEET NOTE
[x] [facility] &  Therapy  [address]  [phone]  F: [phone]     Physical Therapy Daily Treatment Note    Date:  2023  Patient Name:  Enio Mtz      :  1970    MRN: [id number]  Physician: Dr. Shira Quezada, DO/Dr. Jayson Arguello Ave: [place], 25 visits, from 23 to 23. Authorization number [id number]    86016 Approved 72 Units  04606 Approved 72 Units  26278 Approved 72 Units  26669 Approved 72 Units  41120 Approved 72 Units   50970 Approved 18 Units  66790 Approved 72 Units     Medical Diagnosis: S11.239K (ICD-10-CM) - Other closed fracture of distal end of left fibula, initial encounter; adding S93. 401A for Sprain of unspecified ligament of right ankle     Rehab Codes: M 25.571, M 25.572, M 25.671, M 25.672, M 62.81, R 60.0, R 26.89  Onset date: 3/16/23                 Next Dr's appt.: 23    Visit# / total visits:    Cancels/No Shows: 0/0    Pain:  [x] Yes  [] No  Location: Left worse than right             Pain Rating: (0-10 scale) 1/10 L ankle  Pain altered Tx:  [] Yes  [x] No  Action:  Comments:  Has been working it over the weekend. Less pain on the bottom of the foot, lots of stretching at home and hyper volt to back and side of calf. Objective:  Modalities:  Vaso compression L ankle on elevated bolster min pressure 38 deg x10 min   Precautions:  Exercises:  Exercise Reps/ Time Weight/ Level Comments   Treadmill 5 m 1.3-1.5  mph Cues for step length, toes off and stance   Nustep 5' L3          Standing      Gastroc stretch 3x20\"  Gentle on wedge   Soleus stretch 3x30\" 12\" Added 5.25   Rockerboard 15x ea Level 2 Ant/post....  EV/IV   Heel/toe raise 20x ea  Rocking into comfortable ROM   3 way heel raise 10x ea  Toes in/ toes out   Marches 20x2 Foam    Hip abd 10x2 CKC Without    SLS 5x5\" R  5x1-4\" L  No UE   JOHN board 10x ea Level 2                Seated      Towel

## 2023-06-01 ENCOUNTER — HOSPITAL ENCOUNTER (OUTPATIENT)
Dept: PHYSICAL THERAPY | Age: 53
Setting detail: THERAPIES SERIES
Discharge: HOME | End: 2023-06-01
Payer: MEDICAID

## 2023-06-01 PROCEDURE — 97140 MANUAL THERAPY 1/> REGIONS: CPT

## 2023-06-01 PROCEDURE — 97110 THERAPEUTIC EXERCISES: CPT

## 2023-06-01 PROCEDURE — 97016 VASOPNEUMATIC DEVICE THERAPY: CPT

## 2023-06-01 NOTE — FLOWSHEET NOTE
DF/PF- IV/EV 4-/5; R 5/5   ? Function:  Patient able to ambulate 20 minutes without assistive device, without antalgia MET  Patient to be independent with home exercise program as demonstrated by performance with correct form without cues. MET  Demonstrate Knowledge of fall prevention MET    LTG: (to be met in 18 treatments)  Left ankle pain improve to 1/10 at max; right ankle pain resolve. Bilateral ankle AROM improve to DF 3 degrees  Patient able to SLS each leg 10 seconds without LOB and without UE assist  LEFS score improve to 25% functionally impaired or less  Patient resume working as [employer] 4+/5                 Patient goals: \"Be able to have strength, mobility, and put a shoe on\"       Pt. Education:  [x] Yes  [] No  [x] Reviewed Prior HEP/Ed  Method of Education: [x] Verbal  [x] Demo  [] Written  Comprehension of Education:   [x] Verbalizes understanding. [x] Demonstrates understanding. [] Needs review. [x] Demonstrates/verbalizes HEP/Ed previously given. Access Code: [email or web address]  URL: [employer]/  Date: 05/11/2023  Prepared by: Margie Meraz    Exercises  - Ankle Dorsiflexion with Resistance  - 1 x daily - 7 x weekly - 3 sets - 10 reps  - Ankle Eversion with Resistance  - 1 x daily - 7 x weekly - 3 sets - 10 reps  - Ankle Inversion with Resistance  - 1 x daily - 7 x weekly - 3 sets - 10 reps  - Ankle and Toe Plantarflexion with Resistance  - 1 x daily - 7 x weekly - 3 sets - 10 reps  - Gastroc Stretch on Wall  - 1 x daily - 7 x weekly - 3 sets - 10 reps  - SLS  - 1 x daily - 7 x weekly - 3 sets - 10 reps  - Heel raises  - 1 x daily - 7 x weekly - 3 sets - 10 reps  - Hip abd  - 1 x daily - 7 x weekly - 3 sets - 10 reps    Access Code: [email or web address]  URL: [employer]/  Date: 05/05/2023  Prepared by: Mirna York     Exercises  - Seated Calf Towel Stretch  - 1 x daily - 7 x weekly - 3 sets - 10 reps  - Supine Ankle Pumps  - 1 x daily - 7 x weekly - 3

## 2023-06-05 ENCOUNTER — APPOINTMENT (OUTPATIENT)
Dept: PHYSICAL THERAPY | Age: 53
End: 2023-06-05
Payer: MEDICAID

## 2023-06-06 ENCOUNTER — HOSPITAL ENCOUNTER (OUTPATIENT)
Dept: PHYSICAL THERAPY | Age: 53
Setting detail: THERAPIES SERIES
Discharge: HOME | End: 2023-06-06
Payer: MEDICAID

## 2023-06-06 PROCEDURE — 97016 VASOPNEUMATIC DEVICE THERAPY: CPT

## 2023-06-06 PROCEDURE — 97110 THERAPEUTIC EXERCISES: CPT

## 2023-06-06 NOTE — FLOWSHEET NOTE
[x] [facility] &  Therapy  [address]  [phone]  F: [phone]     Physical Therapy Daily Treatment Note    Date:  2023  Patient Name:  Mortimer Raring      :  1970    MRN: [id number]  Physician: Dr. Neville Morin, DO/Dr. Jayson Arguello Ave: [place], 25 visits, from 23 to 23. Authorization number [id number]    24404 Approved 72 Units  27064 Approved 72 Units  50364 Approved 72 Units  03187 Approved 72 Units  98349 Approved 72 Units   84014 Approved 18 Units  91349 Approved 72 Units     Medical Diagnosis: N82.765F (ICD-10-CM) - Other closed fracture of distal end of left fibula, initial encounter; adding S93. 401A for Sprain of unspecified ligament of right ankle     Rehab Codes: M 25.571, M 25.572, M 25.671, M 25.672, M 62.81, R 60.0, R 26.89  Onset date: 3/16/23                 Next 's appt.: 23    Visit# / total visits: 10/18   Cancels/No Shows: 0/0    Pain:  [x] Yes  [] No  Location: Left worse than right             Pain Rating: (0-10 scale) 1/10 L ankle  Pain altered Tx:  [] Yes  [x] No  Action:  Comments:  Patient states pain bothers her more at night. Pain up to a 2/10, just enough to make it difficult to sleep. Continues to have stiffness. Objective:  Modalities:  Vaso compression L ankle on elevated bolster Mod pressure 38 deg x10 min   Precautions:  Exercises:  Exercise Reps/ Time Weight/ Level  Comments   Treadmill 5 m 1.5 mph     Nustep 5' L3 x No UE           Standing       Gastroc stretch 3x20\"  x Gentle on wedge   Soleus stretch 3x30\" 12\" x Added 5.25   Rockerboard  Level 2  Ant/post....  EV/IV   Heel/toe raise 20x ea   Rocking into comfortable ROM   3 way heel raise 10x ea   Toes in/ toes out   Marches 20x2 Foam     Hip abd 10x2 CKC  Foam  Added foam pad 6.1   SLS 3x20\"  x    JOHN board  Level 2     Step ups 10x ea 6\" x Added 6.6   Lateral step ups 10x 6\" x Added 6.6

## 2023-06-07 ENCOUNTER — APPOINTMENT (OUTPATIENT)
Dept: PHYSICAL THERAPY | Age: 53
End: 2023-06-07
Payer: MEDICAID

## 2023-06-08 ENCOUNTER — HOSPITAL ENCOUNTER (OUTPATIENT)
Dept: PHYSICAL THERAPY | Age: 53
Setting detail: THERAPIES SERIES
Discharge: HOME | End: 2023-06-08
Payer: MEDICAID

## 2023-06-08 PROCEDURE — 97140 MANUAL THERAPY 1/> REGIONS: CPT

## 2023-06-08 PROCEDURE — 97110 THERAPEUTIC EXERCISES: CPT

## 2023-06-08 NOTE — FLOWSHEET NOTE
reps  - Heel raises  - 1 x daily - 7 x weekly - 3 sets - 10 reps  - Hip abd  - 1 x daily - 7 x weekly - 3 sets - 10 reps    Access Code: [email or web address]  URL: [email or web address]  Date: 05/05/2023  Prepared by: Pablo Jimenez  - Seated Calf Towel Stretch  - 1 x daily - 7 x weekly - 3 sets - 10 reps  - Supine Ankle Pumps  - 1 x daily - 7 x weekly - 3 sets - 10 reps  - Supine Ankle Inversion Eversion AROM  - 1 x daily - 7 x weekly - 3 sets - 10 reps  - Supine Ankle Circles  - 1 x daily - 7 x weekly - 3 sets - 10 reps  - Seated Toe Towel Scrunches  - 1 x daily - 7 x weekly - 3 sets - 10 reps  - Seated Toe Raise  - 1 x daily - 7 x weekly - 3 sets - 10 reps  - Seated Heel Toe Raises  - 1 x daily - 7 x weekly - 3 sets - 10 reps  - Seated Ankle Dorsiflexion Stretch  - 1 x daily - 7 x weekly - 3 sets - 10 reps    Plan: [x] Continue current frequency toward long and short term goals.     [x] Specific Instructions for subsequent treatments: Advance standing to steps, stairs        Time In: 1104        Time Out: 1155      Electronically signed by:  Tae Smith PTA

## 2023-06-19 ENCOUNTER — APPOINTMENT (OUTPATIENT)
Dept: PHYSICAL THERAPY | Age: 53
End: 2023-06-19
Payer: MEDICAID

## 2023-06-20 ENCOUNTER — APPOINTMENT (OUTPATIENT)
Dept: PHYSICAL THERAPY | Age: 53
End: 2023-06-20
Payer: MEDICAID

## 2023-06-20 ENCOUNTER — HOSPITAL ENCOUNTER (OUTPATIENT)
Dept: PHYSICAL THERAPY | Age: 53
Setting detail: THERAPIES SERIES
Discharge: HOME | End: 2023-06-20
Payer: MEDICAID

## 2023-06-20 PROCEDURE — 97016 VASOPNEUMATIC DEVICE THERAPY: CPT

## 2023-06-20 PROCEDURE — 97110 THERAPEUTIC EXERCISES: CPT

## 2023-06-20 NOTE — FLOWSHEET NOTE
[x] [facility] &  Therapy  [address]  [phone]  F: [phone]     Physical Therapy Daily Treatment Note    Date:  2023  Patient Name:  Radha Martin      :  1970    MRN: [id number]  Physician: Dr. Riya Barksdale DO/Dr. Jayson Arguello Ave: [place], 25 visits, from 23 to 23. Authorization number [id number]    79049 Approved 72 Units  26481 Approved 72 Units  33137 Approved 72 Units  85032 Approved 72 Units  92089 Approved 72 Units   80320 Approved 18 Units  14984 Approved 72 Units     Medical Diagnosis: Q48.462I (ICD-10-CM) - Other closed fracture of distal end of left fibula, initial encounter; adding S93. 401A for Sprain of unspecified ligament of right ankle     Rehab Codes: M 25.571, M 25.572, M 25.671, M 25.672, M 62.81, R 60.0, R 26.89  Onset date: 3/16/23                 Next 's appt.: 23    Visit# / total visits:    Cancels/No Shows: 0/0    Pain:  [x] Yes  [] No  Location: Left worse than right             Pain Rating: (0-10 scale) 0-1/10 L ankle  Pain altered Tx:  [] Yes  [x] No  Action:  Comments:  Patient reports she was standing on a chair the other day, felt a pop when stepping down. Pop was in back of ankle but is now feeling better. Notices she is walking better. Objective:  Modalities:  Vaso compression L ankle on elevated bolster Mod pressure 38 deg x10 min  -                      HP to L ankle x10 min post exercise -- HELD  Precautions:  Exercises:  Exercise Reps/ Time Weight/ Level  Comments   Treadmill     Retrol 5 m  3 min 1.5 mph  1.0 mph x Added retro 6.20   Nustep 5' L3  No UE           Standing       Gastroc stretch 3x20\"  x Gentle on wedge   Soleus stretch 3x30\" 12\" x    Rockerboard 20x Level 2 x Ant/post....  EV/IV   Heel/toe raise 20x ea  x Rocking into comfortable ROM   3 way heel raise 10x ea  x Toes in/ toes out   Marches 20x2 Foam x    Hip abd

## 2023-06-22 ENCOUNTER — HOSPITAL ENCOUNTER (OUTPATIENT)
Dept: PHYSICAL THERAPY | Age: 53
Setting detail: THERAPIES SERIES
Discharge: HOME | End: 2023-06-22
Payer: MEDICAID

## 2023-06-22 PROCEDURE — 97110 THERAPEUTIC EXERCISES: CPT

## 2023-06-22 NOTE — FLOWSHEET NOTE
[x] [facility] &  Therapy  [address]  [phone]  F: [phone]     Physical Therapy Daily Treatment Note    Date:  2023  Patient Name:  Ronn Barfield      :  1970    MRN: [id number]  Physician: Dr. Fior Bergman DO/Dr. Jayson Arguello Ave: [place], 25 visits, from 23 to 23. Authorization number [id number]    35928 Approved 72 Units  54637 Approved 72 Units  72319 Approved 72 Units  47343 Approved 72 Units  89358 Approved 72 Units   02375 Approved 18 Units  08199 Approved 72 Units     Medical Diagnosis: S82.832A (ICD-10-CM) - Other closed fracture of distal end of left fibula, initial encounter; adding S93. 401A for Sprain of unspecified ligament of right ankle     Rehab Codes: M 25.571, M 25.572, M 25.671, M 25.672, M 62.81, R 60.0, R 26.89  Onset date: 3/16/23                 Next Dr's appt.: 23    Visit# / total visits: 15   Cancels/No Shows: 0/0    Pain:  [x] Yes  [] No  Location: Left worse than right             Pain Rating: (0-10 scale) 0-1/10 L ankle  Pain altered Tx:  [] Yes  [x] No  Action:  Comments:  Patient reports she finds herself walking faster. Usually sore at end of day and needs to stretch it. Objective:  Modalities:  Vaso compression L ankle on elevated bolster Mod pressure 38 deg x10 min  -  HELD                      HP to L ankle x10 min post exercise   Precautions:  Exercises:  Exercise Reps/ Time Weight/ Level  Comments   Treadmill     Retrol 5 m  3 min 1.5 mph  1.0 mph  Added retro 6.20   Nustep 5' L3  No UE           Standing       Gastroc stretch 3x20\"  x Gentle on wedge   Soleus stretch 3x30\" 12\" x    Rockerboard 20x Level 2  Ant/post....  EV/IV   Heel/toe raise 20x ea  x Rocking into comfortable ROM   3 way heel raise 10x ea  x Toes in/ toes out   March 20x2 Foam x    Hip abd 10x2 CKC  Foam  Fast movements   SLS on foam 3x20\"  x Added foam 6.20   4

## 2023-06-27 ENCOUNTER — HOSPITAL ENCOUNTER (OUTPATIENT)
Dept: PHYSICAL THERAPY | Age: 53
Setting detail: THERAPIES SERIES
Discharge: HOME | End: 2023-06-27
Payer: MEDICAID

## 2023-06-27 ENCOUNTER — OFFICE VISIT (OUTPATIENT)
Dept: ORTHOPEDIC SURGERY | Age: 53
End: 2023-06-27
Payer: MEDICAID

## 2023-06-27 VITALS — WEIGHT: 181 LBS | HEIGHT: 63 IN | BODY MASS INDEX: 32.07 KG/M2

## 2023-06-27 DIAGNOSIS — S93.401D SPRAIN OF RIGHT ANKLE, UNSPECIFIED LIGAMENT, SUBSEQUENT ENCOUNTER: ICD-10-CM

## 2023-06-27 DIAGNOSIS — S82.832D OTHER CLOSED FRACTURE OF DISTAL END OF LEFT FIBULA WITH ROUTINE HEALING, SUBSEQUENT ENCOUNTER: Primary | ICD-10-CM

## 2023-06-27 PROCEDURE — 99213 OFFICE O/P EST LOW 20 MIN: CPT | Performed by: NURSE PRACTITIONER

## 2023-06-27 PROCEDURE — G8427 DOCREV CUR MEDS BY ELIG CLIN: HCPCS | Performed by: NURSE PRACTITIONER

## 2023-06-27 PROCEDURE — 4004F PT TOBACCO SCREEN RCVD TLK: CPT | Performed by: NURSE PRACTITIONER

## 2023-06-27 PROCEDURE — G8417 CALC BMI ABV UP PARAM F/U: HCPCS | Performed by: NURSE PRACTITIONER

## 2023-06-27 PROCEDURE — 3017F COLORECTAL CA SCREEN DOC REV: CPT | Performed by: NURSE PRACTITIONER

## 2023-06-27 PROCEDURE — 97110 THERAPEUTIC EXERCISES: CPT

## 2023-06-29 ENCOUNTER — APPOINTMENT (OUTPATIENT)
Dept: PHYSICAL THERAPY | Age: 53
End: 2023-06-29
Payer: MEDICAID

## 2023-07-06 ENCOUNTER — HOSPITAL ENCOUNTER (OUTPATIENT)
Dept: PHYSICAL THERAPY | Age: 53
Setting detail: THERAPIES SERIES
Discharge: HOME | End: 2023-07-06

## 2023-07-06 NOTE — FLOWSHEET NOTE
[x] [facility] &  Therapy  [address]    [phone]  F: [phone]   [] [address] [facility]  [address]   [address]  P: [phone]  F: [phone]  [] [address]  [address]  P: [phone]  F: [phone] [] Timo Tova  P: [phone]  F: [phone]  [] [address] [facility]  [address]   [address]   [place]: [phone]  F: [phone]   [] [address] [facility]  [address]  [place]: [phone]   F: [phone]     Physical Therapy Cancel/No Show note    Date: 2023  Patient: Lizy Lao  : 1970  MRN: [id number]    Cancels/No Shows to date:     For today's appointment patient:    []  Cancelled    [] Rescheduled appointment    [x] No-show     Reason given by patient:    []  Patient ill    []  Conflicting appointment    [] No transportation      [] Conflict with work    [] No reason given    [] Weather related    [] COVID-19    [x] Other:      Comments:  Patient not currently scheduled. Called and LVM.   2 visits remaining with ins auth expiring 23      [] Next appointment was confirmed    Electronically signed by: Sunny Mathew PTA

## 2023-07-14 ENCOUNTER — OFFICE VISIT (OUTPATIENT)
Dept: ORTHOPEDIC SURGERY | Age: 53
End: 2023-07-14

## 2023-07-14 VITALS — BODY MASS INDEX: 32.07 KG/M2 | HEIGHT: 63 IN | WEIGHT: 181 LBS

## 2023-07-14 DIAGNOSIS — S82.832D OTHER CLOSED FRACTURE OF DISTAL END OF LEFT FIBULA WITH ROUTINE HEALING, SUBSEQUENT ENCOUNTER: Primary | ICD-10-CM

## 2023-07-14 DIAGNOSIS — M21.612 BUNION OF GREAT TOE OF LEFT FOOT: ICD-10-CM

## 2023-07-25 ENCOUNTER — HOSPITAL ENCOUNTER (OUTPATIENT)
Dept: PHYSICAL THERAPY | Age: 53
Setting detail: THERAPIES SERIES
Discharge: HOME | End: 2023-07-25
Payer: MEDICAID

## 2023-07-25 PROCEDURE — 97164 PT RE-EVAL EST PLAN CARE: CPT

## 2023-07-25 NOTE — PROGRESS NOTES
fracture, dislocation, subluxation. Well-maintained tibiotalar joint alignment. Impression: Unremarkable films of the right ankle. 4/25/23- Findings: 3 views of the left ankle (AP, lateral, mortise) demonstrating distal fibula fracture with minimal displacement, maintained from previous. Minimal signs of healing seen from previous. No new acute fracture, dislocation, subluxation seen. Well-maintained alignment of the tibiotalar joint. Impression: Stable appearing distal fibula fracture. Objective:  Test Measurements:     Right ankle  AROM Left ankle  AROM Left ankle after PROM   Inversion 30 30    Eversion 26 12 17   DF 8 8    PF 51 36 40         Left ankle DF 4/5, PF 4/5, inversion 4-/5, eversion 4-/5    Function: LFES score of 36% functionally impaired. Feels as if ankle continues to swell. Unable to step down steps with confidence. Fears she is going to fall again going down steps. Feels as if she does not have strength in ankle as well. Assessment:  STG: (to be met in 10 treatments)  Left ankle pain improve to 1/10 at max  Left ankle AROM improve to: eversion 25 degrees, plantarflexion 45 degrees  Patient able to SLS left leg 10 seconds without LOB and without UE assist   LEFS score improve to 20% functionally impaired or less  Patient able to ambulate up/down 10 steps without handrail with improved confidence without LOB  L LE strength grossly 4+/5                 Patient goals:   \"Be able to have strength, mobility, and put a shoe on\"          Treatment Plan:  [x] Therapeutic Exercise   22388  [] Iontophoresis: 4 mg/mL Dexamethasone Sodium Phosphate  mAmin  09291   [x] Therapeutic Activity  31632 [x] Vasopneumatic cold with compression  14281    [x] Gait Training   52802 [] Ultrasound   [id number]   [] Neuromuscular Re-education  14726 [] Electrical Stimulation Unattended  47007   [x] Manual Therapy  14817 [x] Electrical Stimulation Attended  90848   [x] Instruction in HEP  []

## 2023-07-28 NOTE — PRE-CERTIFICATION NOTE
Swati Boyd ...       [x] [facility] &  [facility]rapy  [address] [facility] [facility] [facility].  [phone]  F: [phone] [] [address] [facility]  [address]  P: [phone]  F: [phone] [] [address]  [address]  P: [phone]  F: [phone] [] [address] [facility]   [address]   [place]: [phone]  F: [phone]  [] [address] [facility]   Outpatient Occupational Therapy  [address] [facility]: [phone]  F: [phone]          Therapy Pre-certification Note      7/28/2023    Kannan Myers  1970   [id number]      [id number] approval was received for Physical Therapy from [facility] on 07/28/23 . Approval was received for 10 visits, from 07/28/23 to 09/08/23. Authorization number [id number].     Patient was contacted to be scheduled and 08/01/23 2:00pm.      19504 approved 40 units  89509 approved 40 units  15496 approved 40 units  24573 approved 40 units  35500 approved 10 units  0486 31 38 97 approved 40 units      Electronically signed by Deya Delgadillo on 7/28/2023 at 12:26 PM

## 2023-08-01 ENCOUNTER — HOSPITAL ENCOUNTER (OUTPATIENT)
Dept: PHYSICAL THERAPY | Age: 53
Setting detail: THERAPIES SERIES
Discharge: HOME | End: 2023-08-01
Payer: MEDICAID

## 2023-08-01 PROCEDURE — 97110 THERAPEUTIC EXERCISES: CPT

## 2023-08-01 NOTE — FLOWSHEET NOTE
Needs review. [] Demonstrates/verbalizes HEP/Ed previously given. Plan: [x] Continue current frequency toward long and short term goals.     [x] Specific Instructions for subsequent treatments: lunges on bosu, squats       Time In: 2:09 pm            Time Out: 2:42 pm    Electronically signed by:  Gavin Carrion PTA

## 2023-08-02 DIAGNOSIS — M79.672 LEFT FOOT PAIN: Primary | ICD-10-CM

## 2023-08-02 SDOH — HEALTH STABILITY: PHYSICAL HEALTH: ON AVERAGE, HOW MANY MINUTES DO YOU ENGAGE IN EXERCISE AT THIS LEVEL?: 30 MIN

## 2023-08-03 ENCOUNTER — OFFICE VISIT (OUTPATIENT)
Dept: ORTHOPEDIC SURGERY | Age: 53
End: 2023-08-03
Payer: MEDICAID

## 2023-08-03 VITALS — OXYGEN SATURATION: 100 % | RESPIRATION RATE: 16 BRPM | BODY MASS INDEX: 31.89 KG/M2 | WEIGHT: 180 LBS | HEIGHT: 63 IN

## 2023-08-03 DIAGNOSIS — M21.862 GASTROCNEMIUS EQUINUS OF LEFT LOWER EXTREMITY: ICD-10-CM

## 2023-08-03 DIAGNOSIS — M20.22 HALLUX RIGIDUS OF LEFT FOOT: Primary | ICD-10-CM

## 2023-08-03 PROCEDURE — 3017F COLORECTAL CA SCREEN DOC REV: CPT | Performed by: ORTHOPAEDIC SURGERY

## 2023-08-03 PROCEDURE — G8417 CALC BMI ABV UP PARAM F/U: HCPCS | Performed by: ORTHOPAEDIC SURGERY

## 2023-08-03 PROCEDURE — 4004F PT TOBACCO SCREEN RCVD TLK: CPT | Performed by: ORTHOPAEDIC SURGERY

## 2023-08-03 PROCEDURE — G8427 DOCREV CUR MEDS BY ELIG CLIN: HCPCS | Performed by: ORTHOPAEDIC SURGERY

## 2023-08-03 PROCEDURE — 99214 OFFICE O/P EST MOD 30 MIN: CPT | Performed by: ORTHOPAEDIC SURGERY

## 2023-08-08 ENCOUNTER — HOSPITAL ENCOUNTER (OUTPATIENT)
Dept: PHYSICAL THERAPY | Age: 53
Setting detail: THERAPIES SERIES
Discharge: HOME | End: 2023-08-08
Payer: MEDICAID

## 2023-08-08 NOTE — FLOWSHEET NOTE
[x] [facility] ([facility]) - [facility] &  Therapy  [address] [facility].    [phone]  F: [phone]   [] [address]  [address]  P: [phone]  F: [phone]  [] [address]  [address]  P: [phone]  F: [phone] [] [place]  P: [phone]  F: [phone]  [] [address] [facility]   [address]   [place]: [phone]  F: [phone]   [] [address] [facility]  [address]  [place]: [phone]   F: [phone]     Physical Therapy Cancel/No Show note    Date: 2023  Patient: Karla Box  : 1970  MRN: [id number]    Cancels/No Shows to date:     For today's appointment patient:    [x]  Cancelled    [] Rescheduled appointment    [] No-show     Reason given by patient:    [x]  Patient ill    []  Conflicting appointment    [] No transportation      [] Conflict with work    [] No reason given    [] Weather related    [] [id number]    [] Other:      Comments:        [x] Next appointment was confirmed    Electronically signed by: Jasper Owen PTA

## 2023-08-10 ENCOUNTER — HOSPITAL ENCOUNTER (OUTPATIENT)
Dept: PHYSICAL THERAPY | Age: 53
Setting detail: THERAPIES SERIES
Discharge: HOME | End: 2023-08-10
Payer: MEDICAID

## 2023-08-10 PROCEDURE — 97110 THERAPEUTIC EXERCISES: CPT

## 2023-08-10 NOTE — FLOWSHEET NOTE
Precautions:  Exercises:  Exercise Reps/ Time Weight/ Level Comments   SciFit  L1 In use         Standing      Heel/toe raises   15x     3 way closed hip   15x  Standing on L LE   L SLS    3x 30 sec    Step up    15x 8\"    Step up lateral  15x 8\"    Step downs   15x 6\"    Gastroc stretch    3x 30 sec wedge   Soleus stretch  3x 30 sec wedge         Leg press 10x2 20 lbs          Full flight of steps            Seated   added   Toe curls 15x     Toe yoga 15x     Domers 15x     Toe abduction 15x     Gastroc with inversion stretch 3x 20 sec towel   PROM 5 mins     Other:      Treatment Charges: Mins Units   []  Modalities     [x]  Ther Exercise 38 3   []  Manual Therapy     []  Ther Activities     []  Neuro Re-ed     []  Vasocompression     [] Gait     []  Other     Total Treatment time 38 mins 3       Assessment: [x] Progressing toward goals. Added seated exercises this date to increase ankle ROM and strengthening, especially foot intrinsics. Continued exercises per log with cues for exercise techniques and progressions. No noted complaints of pain noted during treatment. [] No change. [] Other:  [x] Patient would continue to benefit from skilled physical therapy services in order to: improve bilateral ankle ROM and strength, improve balance and gait, improve standing tolerance and safety while standing, improving functional abilities. LTG: (to be met in 10 treatments)  Left ankle pain improve to 1/10 at max  Left ankle AROM improve to: eversion 25 degrees, plantarflexion 45 degrees  Patient able to SLS left leg 10 seconds without LOB and without UE assist   LEFS score improve to 20% functionally impaired or less  Patient able to ambulate up/down 10 steps without handrail with improved confidence without LOB  L LE strength grossly 4+/5                 Patient goals: \"Be able to have strength, mobility, and put a shoe on\"    Pt.  Education:  [x] Yes  [] No  [x] Reviewed Prior HEP/Ed  Method of Education:

## 2023-08-15 ENCOUNTER — HOSPITAL ENCOUNTER (OUTPATIENT)
Dept: PHYSICAL THERAPY | Age: 53
Setting detail: THERAPIES SERIES
Discharge: HOME | End: 2023-08-15
Payer: MEDICAID

## 2023-08-15 PROCEDURE — 97110 THERAPEUTIC EXERCISES: CPT

## 2023-08-15 NOTE — FLOWSHEET NOTE
[x] [address]  [address] [facility].  [phone]  F: [phone] [] [address] [facility]  [address]   [address]  P: [phone]  F: [phone] [] [address]  [address] [facility]  P: [phone]  F: [phone] [] [facility]: [phone]  F: [phone] [] [address] [facility]  [address] [facility]   [address]   P: [phone]  F: [phone]  [] [address] [facility].   P: [phone]  F: [phone] [] [address] [facility]  [address] [facility]  P: [phone]  F: [phone] [] [address] [facility]  [address] [place] [address]  [place]: [phone]  F: [phone] [] [address] [facility] [address]  [place]: [phone]  F: [phone]      Physical Therapy Daily Treatment Note    Date:  8/15/2023  Patient Name:  Lb Hebert    :  1970  MRN: [id number]  Physician: INGRID Ashford-CNP   Insurance: [place], 10 visits, from 23 to 23     Medical Diagnosis: S82.832D (ICD-10-CM) - Other closed fracture of distal end of left fibula with routine healing, subsequent encounter      Rehab Codes: M 25.572, M 25.672, M 79.662, M 62.81, R 26.89  Onset Date: 3/16/23                 Next Dr. Janice Berg: NA  Visit# / total visits: 4/10     Cancels/No Shows: 1/0    Subjective:    Pain:  [x] Yes  [] No Location: L ankle Pain Rating: (0-10 scale) 0/10  Pain altered Tx:  [x] No  [] Yes  Action:  Comments: Patient reports no pain this date.  Also noted no popping in the toe

## 2023-08-17 ENCOUNTER — HOSPITAL ENCOUNTER (OUTPATIENT)
Dept: PHYSICAL THERAPY | Age: 53
Setting detail: THERAPIES SERIES
Discharge: HOME | End: 2023-08-17
Attending: ORTHOPAEDIC SURGERY
Payer: MEDICAID

## 2023-08-17 NOTE — FLOWSHEET NOTE
[x] [facility] - [facility] &  Therapy  [address] [facility].    [phone]  F: [phone]   [] [address]  [address]  P: [phone]  F: [phone]  [] [address]  [address]  P: [phone]  F: [phone] [] [facility]  P: [phone]  F: [phone]  [] [address] [facility]  [address]   [place]: [phone]  F: [phone]   [] [address] [facility]  [address]  [facility]: [phone]   F: [phone]     Physical Therapy Cancel/No Show note    Date: 2023  Patient: Kannan Myers  : 1970  MRN: [id number]    Cancels/No Shows to date:     For today's appointment patient:    [x]  Cancelled    [] Rescheduled appointment    [] No-show     Reason given by patient:    []  Patient ill    []  Conflicting appointment    [x] No transportation--car is in the shop      [] Conflict with work    [] No reason given    [] Weather related    [] [id number]    [] Other:      Comments:        [x] Next appointment was confirmed    Electronically signed by: Cristina Villafana PTA

## 2023-08-22 ENCOUNTER — HOSPITAL ENCOUNTER (OUTPATIENT)
Dept: PHYSICAL THERAPY | Age: 53
Setting detail: THERAPIES SERIES
Discharge: HOME | End: 2023-08-22
Payer: MEDICAID

## 2023-08-22 PROCEDURE — 97110 THERAPEUTIC EXERCISES: CPT

## 2023-08-22 NOTE — FLOWSHEET NOTE
[x] [address]  [address] [facility] [place] [facility].  [phone]  F: [phone] [] [address] [facility]  [address]  P: [phone]  F: [phone] [] [address]  [address] [facility]  P: [phone]  F: [phone] [] [place]: [phone]  F: [phone] [] [address] [facility]  [address] [facility]   [address]   P: [phone]  F: [phone]  [] [address] [facility].   P: [phone]  F: [phone] [] [address] [facility]  [address] [facility] [address]  P: [phone]  F: [phone] [] [address] [facility]  [address] [facility]  [place]: [phone]  F: [phone] [] [address] [facility] [address]  [place]: [phone]  F: [phone]      Physical Therapy Daily Treatment Note    Date:  2023  Patient Name:  Apurva Tolliver    :  1970  MRN: [id number]  Physician: INGRID [facility]   Insurance: [place], 10 visits, from 23 to 23     Medical Diagnosis: S82.832D (ICD-10-CM) - Other closed fracture of distal end of left fibula with routine healing, subsequent encounter      Rehab Codes: M 25.572, M 25.672, M 79.662, M 62.81, R 26.89  Onset Date: 3/16/23                 Next Dr. Azell Osler: NA  Visit# / total visits: 5/10     Cancels/No Shows: 2/0    Subjective:    Pain:  [x] Yes  [] No Location: L ankle Pain Rating: (0-10 scale) 1/10  Pain altered Tx:  [x] No  [] Yes  Action:  Comments: Patient reports minimal pain in ankle this date.  Notes she is still getting cramps and

## 2023-08-24 ENCOUNTER — HOSPITAL ENCOUNTER (OUTPATIENT)
Dept: PHYSICAL THERAPY | Age: 53
Setting detail: THERAPIES SERIES
Discharge: HOME | End: 2023-08-24
Payer: MEDICAID

## 2023-08-24 PROCEDURE — 97110 THERAPEUTIC EXERCISES: CPT

## 2023-08-24 NOTE — FLOWSHEET NOTE
Precautions:  Exercises:  Exercise Reps/ Time Weight/ Level Comments   SciFit   In use this date         Standing      Heel/toe raises   15x2     3 way closed hip   15x  Standing on L LE   L SLS     3x 30 sec No UE support   Step up       15x 8\"    Step up lateral     15x 8\"    Step downs     15x 6\"    Gastroc stretch     3x 30 sec wedge   Soleus stretch   3x 30 sec wedge   Lunges     10x  Fwd/ lat   Mini squats   10x           Leg press  10x2 20 lbs          Full flight of steps            Seated      Toe curls   15x     Toe yoga     15x     Domers    15x     Toe abduction     15x     Gastroc with inversion stretch 3x 20 sec towel   PROM      Other:      Treatment Charges: Mins Units   []  Modalities     [x]  Ther Exercise 26 2   []  Manual Therapy     []  Ther Activities     []  Neuro Re-ed     []  Vasocompression     [] Gait     []  Other     Total Treatment time 26 mins 2        Assessment: [x] Progressing toward goals. Completed exercises per log this date to increase ankle ROM and strength. Patient notes some foot cramping with gastroc stretching this date but went away after completing stretches. Minimal verbal cues for exercise techniques and progressions with good carryover. No noted complaints after treatment session. [] No change. [] Other:  [x] Patient would continue to benefit from skilled physical therapy services in order to: improve bilateral ankle ROM and strength, improve balance and gait, improve standing tolerance and safety while standing, improving functional abilities.      LTG: (to be met in 10 treatments)  Left ankle pain improve to 1/10 at max  Left ankle AROM improve to: eversion 25 degrees, plantarflexion 45 degrees  Patient able to SLS left leg 10 seconds without LOB and without UE assist   LEFS score improve to 20% functionally impaired or less  Patient able to ambulate up/down 10 steps without handrail with improved confidence without LOB  L LE strength grossly 4+/5

## 2023-08-29 ENCOUNTER — HOSPITAL ENCOUNTER (OUTPATIENT)
Dept: PHYSICAL THERAPY | Age: 53
Setting detail: THERAPIES SERIES
Discharge: HOME | End: 2023-08-29
Payer: MEDICAID

## 2023-08-29 PROCEDURE — 97110 THERAPEUTIC EXERCISES: CPT

## 2023-08-29 NOTE — FLOWSHEET NOTE
[x] [address]  [address] [facility].  [phone]  F: [phone] [] [address] [facility]  [address]   [address]  P: [phone]  F: [phone] [] [address]  [address] [facility]  P: [phone]  F: [phone] [] [place]: [phone]  F: [phone] [] [address] [facility]  [address] [facility]   [address]   P: [phone]  F: [phone]  [] [address] [facility].   P: [phone]  F: [phone] [] [address] [facility]  [address] [facility]   [address]  P: [phone]  F: [phone] [] [address] [facility]  [address] [facility]  [place]: [phone]  F: [phone] [] [address] [place] [facility] [address]  [place]: [phone]  F: [phone]      Physical Therapy Daily Treatment Note    Date:  2023  Patient Name:  Jake Malloy    :  1970  MRN: [id number]  Physician: JEFF Weathers   Insurance: [place], 10 visits, from 23 to 23     Medical Diagnosis: S82.832D (ICD-10-CM) - Other closed fracture of distal end of left fibula with routine healing, subsequent encounter      Rehab Codes: M 25.572, M 25.672, M 79.662, M 62.81, R 26.89  Onset Date: 3/16/23                 Next Dr. Singh Pichardo: NA  Visit# / total visits: 7/10     Cancels/No Shows: 2/0    Subjective:    Pain:  [x] Yes  [] No Location: L ankle Pain Rating: (0-10 scale) 1/10  Pain altered Tx:  [x] No  [] Yes  Action:  Comments:  Reports she has been busy and is up on feet but ankle get tight fast when sitting and

## 2023-08-31 ENCOUNTER — HOSPITAL ENCOUNTER (OUTPATIENT)
Dept: PHYSICAL THERAPY | Age: 53
Setting detail: THERAPIES SERIES
Discharge: HOME | End: 2023-08-31
Payer: MEDICAID

## 2023-08-31 NOTE — FLOWSHEET NOTE
[x] [facility] &  Therapy  [address] [facility].    [phone]  F: [phone]   [] [address]  [address]  P: [phone]  F: [phone]  [] [address]  [address]  P: [phone]  F: [phone] [] [facility]  P: [phone]  F: [phone]  [] [address] [facility]  [address]   [place]: [phone]  F: [phone]   [] 97 [facility]  [address]  [place]: [phone]   F: [phone]     Physical Therapy Cancel/No Show note    Date: 2023  Patient: Vonnie Abbott  : 1970  MRN: [id number]    Cancels/No Shows to date: 3/0    For today's appointment patient:    [x]  Cancelled    [] Rescheduled appointment    [] No-show     Reason given by patient:    []  Patient ill    []  Conflicting appointment    [] No transportation     [] Conflict with work    [] No reason given    [] Weather related    [] COVID-19    [x] Other:  unable to make appointment    Comments:        [x] Next appointment was confirmed    Electronically signed by: Camilla Lebron PTA

## 2023-09-07 ENCOUNTER — HOSPITAL ENCOUNTER (OUTPATIENT)
Dept: PHYSICAL THERAPY | Age: 53
Setting detail: THERAPIES SERIES
Discharge: HOME | End: 2023-09-07

## 2023-09-07 NOTE — FLOWSHEET NOTE
[x] [facility]) [facility] &  Therapy  [address] [facility].    [phone]  F: [phone]   [] [address]  [address]  P: [phone]  F: [phone]  [] [address]  [address] [facility]  P: [phone]  F: [phone] [] [facility]  P: [phone]  F: [phone]  [] [address] [facility]  [address]   [place]: [phone]  F: [phone]   [] 97 [facility]  [address] [facility] [address]  [place]: [phone]   F: [phone]     Physical Therapy Cancel/No Show note    Date: 2023  Patient: Kaycee Simon  : 1970  MRN: [id number]    Cancels/No Shows to date: 3/2    For today's appointment patient:    []  Cancelled    [] Rescheduled appointment    [x] No-show     Reason given by patient:    []  Patient ill    []  Conflicting appointment    [] No transportation     [] Conflict with work    [] No reason given    [] Weather related    [] [id number]    [x] Other: Called and left patient VM about continuing PT or not.     Comments:        [] Next appointment was confirmed     Electronically signed by: Viry Feliz PTA

## 2024-01-02 DIAGNOSIS — M62.462 GASTROCNEMIUS EQUINUS OF LEFT LOWER EXTREMITY: ICD-10-CM

## 2024-01-02 DIAGNOSIS — M20.22 HALLUX RIGIDUS OF LEFT FOOT: ICD-10-CM
